# Patient Record
Sex: FEMALE | Race: WHITE | NOT HISPANIC OR LATINO | Employment: OTHER | URBAN - METROPOLITAN AREA
[De-identification: names, ages, dates, MRNs, and addresses within clinical notes are randomized per-mention and may not be internally consistent; named-entity substitution may affect disease eponyms.]

---

## 2017-02-16 ENCOUNTER — HOSPITAL ENCOUNTER (OUTPATIENT)
Dept: MAMMOGRAPHY | Facility: HOSPITAL | Age: 62
Discharge: HOME | End: 2017-02-16
Attending: OBSTETRICS & GYNECOLOGY | Admitting: OBSTETRICS & GYNECOLOGY

## 2017-02-24 ENCOUNTER — HOSPITAL ENCOUNTER (OUTPATIENT)
Dept: MAMMOGRAPHY | Facility: HOSPITAL | Age: 62
Discharge: HOME | End: 2017-02-24
Attending: OBSTETRICS & GYNECOLOGY | Admitting: OBSTETRICS & GYNECOLOGY

## 2018-02-09 ENCOUNTER — HOSPITAL ENCOUNTER (OUTPATIENT)
Dept: FAMILY MEDICINE CLINIC | Facility: CLINIC | Age: 63
Setting detail: SPECIMEN
Discharge: HOME | End: 2018-02-09
Attending: INTERNAL MEDICINE | Admitting: INTERNAL MEDICINE

## 2018-02-09 LAB
ALBUMIN SERPL-MCNC: 4.4 G/DL (ref 3.5–4.8)
ALBUMIN/GLOB SERPL: 1.5 {RATIO} (ref 1–1.7)
ALP SERPL-CCNC: 62 IU/L (ref 32–91)
ALT SERPL-CCNC: 25 IU/L (ref 14–54)
ANION GAP SERPL CALC-SCNC: 11.6 MMOL/L (ref 10–20)
AST SERPL-CCNC: 33 IU/L (ref 15–41)
BASOPHILS # BLD AUTO: 0 10*3/UL (ref 0–0.2)
BASOPHILS NFR BLD AUTO: 1 % (ref 0–2)
BILIRUB SERPL-MCNC: 0.8 MG/DL (ref 0.3–1.2)
BUN SERPL-MCNC: 11 MG/DL (ref 8–20)
BUN/CREAT SERPL: 13.8 (ref 5.4–26.2)
CALCIUM SERPL-MCNC: 9.8 MG/DL (ref 8.9–10.3)
CHLORIDE SERPL-SCNC: 102 MMOL/L (ref 101–111)
CHOLEST SERPL-MCNC: 250 MG/DL
CHOLEST/HDLC SERPL: 3.6 {RATIO}
CONV CO2: 28 MMOL/L (ref 22–32)
CONV LDL CHOLESTEROL DIRECT: 166 MG/DL (ref 0–100)
CONV TOTAL PROTEIN: 7.4 G/DL (ref 6.1–7.9)
CREAT UR-MCNC: 0.8 MG/DL (ref 0.4–1)
DIFFERENTIAL METHOD BLD: (no result)
EOSINOPHIL # BLD AUTO: 0 % (ref 0–3)
EOSINOPHIL # BLD AUTO: 0 10*3/UL (ref 0–0.3)
ERYTHROCYTE [DISTWIDTH] IN BLOOD BY AUTOMATED COUNT: 13.4 % (ref 11.5–14.5)
GLOBULIN UR ELPH-MCNC: 3 G/DL (ref 2.5–3.8)
GLUCOSE SERPL-MCNC: 83 MG/DL (ref 65–99)
HCT VFR BLD AUTO: 42.7 % (ref 35–49)
HDLC SERPL-MCNC: 69 MG/DL
HGB BLD-MCNC: 14.4 G/DL (ref 12–15)
LDLC/HDLC SERPL: 2.4 {RATIO}
LIPID INTERPRETATION: ABNORMAL
LYMPHOCYTES # BLD AUTO: 2.8 10*3/UL (ref 0.8–4.8)
LYMPHOCYTES NFR BLD AUTO: 37 % (ref 18–42)
MCH RBC QN AUTO: 30.5 PG (ref 26–32)
MCHC RBC AUTO-ENTMCNC: 33.7 G/DL (ref 32–36)
MCV RBC AUTO: 90.6 FL (ref 80–94)
MONOCYTES # BLD AUTO: 0.4 10*3/UL (ref 0.1–1.3)
MONOCYTES NFR BLD AUTO: 6 % (ref 2–11)
NEUTROPHILS # BLD AUTO: 4.3 10*3/UL (ref 2.3–8.6)
NEUTROPHILS NFR BLD AUTO: 56 % (ref 50–75)
NRBC BLD AUTO-RTO: 0 /100{WBCS}
NRBC/RBC NFR BLD MANUAL: 0 10*3/UL
PLATELET # BLD AUTO: 249 10*3/UL (ref 150–450)
PMV BLD AUTO: 9.4 FL (ref 7.4–10.4)
POTASSIUM SERPL-SCNC: 3.6 MMOL/L (ref 3.6–5.1)
RBC # BLD AUTO: 4.71 10*6/UL (ref 4–5.4)
SODIUM SERPL-SCNC: 138 MMOL/L (ref 136–144)
TRIGL SERPL-MCNC: 58 MG/DL
VLDLC SERPL CALC-MCNC: 15 MG/DL
WBC # BLD AUTO: 7.7 10*3/UL (ref 4.5–11.5)

## 2018-03-27 ENCOUNTER — HOSPITAL ENCOUNTER (OUTPATIENT)
Dept: MAMMOGRAPHY | Facility: HOSPITAL | Age: 63
Discharge: HOME | End: 2018-03-27
Attending: OBSTETRICS & GYNECOLOGY | Admitting: OBSTETRICS & GYNECOLOGY

## 2018-05-22 ENCOUNTER — HOSPITAL ENCOUNTER (OUTPATIENT)
Dept: FAMILY MEDICINE CLINIC | Facility: CLINIC | Age: 63
Setting detail: SPECIMEN
Discharge: HOME | End: 2018-05-22
Attending: INTERNAL MEDICINE | Admitting: INTERNAL MEDICINE

## 2018-05-22 LAB
ALBUMIN SERPL-MCNC: 4.3 G/DL (ref 3.5–4.8)
ALBUMIN/GLOB SERPL: 1.6 {RATIO} (ref 1–1.7)
ALP SERPL-CCNC: 62 IU/L (ref 32–91)
ALT SERPL-CCNC: 22 IU/L (ref 14–54)
ANION GAP SERPL CALC-SCNC: 11.1 MMOL/L (ref 10–20)
AST SERPL-CCNC: 34 IU/L (ref 15–41)
BILIRUB SERPL-MCNC: 0.9 MG/DL (ref 0.3–1.2)
BUN SERPL-MCNC: 7 MG/DL (ref 8–20)
BUN/CREAT SERPL: 7.8 (ref 5.4–26.2)
CALCIUM SERPL-MCNC: 9.8 MG/DL (ref 8.9–10.3)
CHLORIDE SERPL-SCNC: 103 MMOL/L (ref 101–111)
CHOLEST SERPL-MCNC: 209 MG/DL
CHOLEST/HDLC SERPL: 4 {RATIO}
CONV CO2: 30 MMOL/L (ref 22–32)
CONV LDL CHOLESTEROL DIRECT: 129 MG/DL (ref 0–100)
CONV TOTAL PROTEIN: 7 G/DL (ref 6.1–7.9)
CREAT UR-MCNC: 0.9 MG/DL (ref 0.4–1)
GLOBULIN UR ELPH-MCNC: 2.7 G/DL (ref 2.5–3.8)
GLUCOSE SERPL-MCNC: 85 MG/DL (ref 65–99)
HDLC SERPL-MCNC: 53 MG/DL
LDLC/HDLC SERPL: 2.5 {RATIO}
LIPID INTERPRETATION: ABNORMAL
POTASSIUM SERPL-SCNC: 4.1 MMOL/L (ref 3.6–5.1)
SODIUM SERPL-SCNC: 140 MMOL/L (ref 136–144)
TRIGL SERPL-MCNC: 103 MG/DL
VLDLC SERPL CALC-MCNC: 27.7 MG/DL

## 2019-04-30 ENCOUNTER — HOSPITAL ENCOUNTER (OUTPATIENT)
Dept: FAMILY MEDICINE CLINIC | Facility: CLINIC | Age: 64
Setting detail: SPECIMEN
Discharge: HOME | End: 2019-04-30
Attending: INTERNAL MEDICINE | Admitting: INTERNAL MEDICINE

## 2019-04-30 ENCOUNTER — HOSPITAL ENCOUNTER (OUTPATIENT)
Dept: MAMMOGRAPHY | Facility: HOSPITAL | Age: 64
Discharge: HOME | End: 2019-04-30
Attending: OBSTETRICS & GYNECOLOGY | Admitting: OBSTETRICS & GYNECOLOGY

## 2019-04-30 LAB
ALBUMIN SERPL-MCNC: 4.8 G/DL (ref 3.5–4.8)
ALBUMIN/GLOB SERPL: 1.9 {RATIO} (ref 1–1.7)
ALP SERPL-CCNC: 68 IU/L (ref 32–91)
ALT SERPL-CCNC: 21 IU/L (ref 14–54)
ANION GAP SERPL CALC-SCNC: 15.6 MMOL/L (ref 10–20)
AST SERPL-CCNC: 28 IU/L (ref 15–41)
BASOPHILS # BLD AUTO: 0.1 10*3/UL (ref 0–0.2)
BASOPHILS NFR BLD AUTO: 1 % (ref 0–2)
BILIRUB SERPL-MCNC: 0.7 MG/DL (ref 0.3–1.2)
BUN SERPL-MCNC: 7 MG/DL (ref 8–20)
BUN/CREAT SERPL: 7.8 (ref 5.4–26.2)
CALCIUM SERPL-MCNC: 9.9 MG/DL (ref 8.9–10.3)
CHLORIDE SERPL-SCNC: 102 MMOL/L (ref 101–111)
CHOLEST SERPL-MCNC: 203 MG/DL
CHOLEST/HDLC SERPL: 3.4 {RATIO}
CONV CO2: 27 MMOL/L (ref 22–32)
CONV LDL CHOLESTEROL DIRECT: 126 MG/DL (ref 0–100)
CONV TOTAL PROTEIN: 7.3 G/DL (ref 6.1–7.9)
CREAT UR-MCNC: 0.9 MG/DL (ref 0.4–1)
DIFFERENTIAL METHOD BLD: (no result)
EOSINOPHIL # BLD AUTO: 0 % (ref 0–3)
EOSINOPHIL # BLD AUTO: 0 10*3/UL (ref 0–0.3)
ERYTHROCYTE [DISTWIDTH] IN BLOOD BY AUTOMATED COUNT: 13.7 % (ref 11.5–14.5)
GLOBULIN UR ELPH-MCNC: 2.5 G/DL (ref 2.5–3.8)
GLUCOSE SERPL-MCNC: 79 MG/DL (ref 65–99)
HCT VFR BLD AUTO: 45.3 % (ref 35–49)
HDLC SERPL-MCNC: 60 MG/DL
HGB BLD-MCNC: 15 G/DL (ref 12–15)
LDLC/HDLC SERPL: 2.1 {RATIO}
LIPID INTERPRETATION: ABNORMAL
LYMPHOCYTES # BLD AUTO: 3.2 10*3/UL (ref 0.8–4.8)
LYMPHOCYTES NFR BLD AUTO: 31 % (ref 18–42)
MCH RBC QN AUTO: 30.6 PG (ref 26–32)
MCHC RBC AUTO-ENTMCNC: 33.2 G/DL (ref 32–36)
MCV RBC AUTO: 92.3 FL (ref 80–94)
MONOCYTES # BLD AUTO: 0.4 10*3/UL (ref 0.1–1.3)
MONOCYTES NFR BLD AUTO: 4 % (ref 2–11)
NEUTROPHILS # BLD AUTO: 6.5 10*3/UL (ref 2.3–8.6)
NEUTROPHILS NFR BLD AUTO: 64 % (ref 50–75)
NRBC BLD AUTO-RTO: 0 /100{WBCS}
NRBC/RBC NFR BLD MANUAL: 0 10*3/UL
PLATELET # BLD AUTO: 217 10*3/UL (ref 150–450)
PMV BLD AUTO: 10.7 FL (ref 7.4–10.4)
POTASSIUM SERPL-SCNC: 3.6 MMOL/L (ref 3.6–5.1)
RBC # BLD AUTO: 4.91 10*6/UL (ref 4–5.4)
SODIUM SERPL-SCNC: 141 MMOL/L (ref 136–144)
TRIGL SERPL-MCNC: 98 MG/DL
VLDLC SERPL CALC-MCNC: 16.4 MG/DL
WBC # BLD AUTO: 10.1 10*3/UL (ref 4.5–11.5)

## 2020-02-25 ENCOUNTER — TRANSCRIBE ORDERS (OUTPATIENT)
Dept: ADMINISTRATIVE | Facility: HOSPITAL | Age: 65
End: 2020-02-25

## 2020-02-25 DIAGNOSIS — Z12.31 VISIT FOR SCREENING MAMMOGRAM: Primary | ICD-10-CM

## 2020-05-05 ENCOUNTER — TELEPHONE (OUTPATIENT)
Dept: FAMILY MEDICINE CLINIC | Facility: CLINIC | Age: 65
End: 2020-05-05

## 2020-05-05 ENCOUNTER — OFFICE VISIT (OUTPATIENT)
Dept: FAMILY MEDICINE CLINIC | Facility: CLINIC | Age: 65
End: 2020-05-05

## 2020-05-05 ENCOUNTER — HOSPITAL ENCOUNTER (OUTPATIENT)
Dept: MAMMOGRAPHY | Facility: HOSPITAL | Age: 65
Discharge: HOME | End: 2020-05-05
Admitting: OBSTETRICS & GYNECOLOGY

## 2020-05-05 VITALS
WEIGHT: 139 LBS | HEIGHT: 64 IN | DIASTOLIC BLOOD PRESSURE: 80 MMHG | HEART RATE: 54 BPM | OXYGEN SATURATION: 100 % | TEMPERATURE: 98 F | SYSTOLIC BLOOD PRESSURE: 142 MMHG | BODY MASS INDEX: 23.73 KG/M2 | RESPIRATION RATE: 16 BRPM

## 2020-05-05 DIAGNOSIS — Z12.31 VISIT FOR SCREENING MAMMOGRAM: ICD-10-CM

## 2020-05-05 DIAGNOSIS — I10 ESSENTIAL HYPERTENSION: ICD-10-CM

## 2020-05-05 DIAGNOSIS — E55.9 VITAMIN D DEFICIENCY DISEASE: ICD-10-CM

## 2020-05-05 DIAGNOSIS — E53.8 VITAMIN B12 DEFICIENCY: ICD-10-CM

## 2020-05-05 DIAGNOSIS — Z00.00 ENCOUNTER FOR GENERAL ADULT MEDICAL EXAMINATION WITHOUT ABNORMAL FINDINGS: Primary | ICD-10-CM

## 2020-05-05 DIAGNOSIS — E78.01 FAMILIAL HYPERCHOLESTEROLEMIA: ICD-10-CM

## 2020-05-05 PROBLEM — Z12.11 ENCOUNTER FOR SCREENING FOR MALIGNANT NEOPLASM OF COLON: Status: ACTIVE | Noted: 2018-02-09

## 2020-05-05 PROBLEM — I77.9 CAROTID ARTERY DISEASE: Status: ACTIVE | Noted: 2020-05-05

## 2020-05-05 PROBLEM — N63.0 BREAST LUMP: Status: ACTIVE | Noted: 2017-02-20

## 2020-05-05 PROBLEM — E78.5 HYPERLIPIDEMIA: Status: ACTIVE | Noted: 2018-05-22

## 2020-05-05 PROBLEM — E87.5 HYPERKALEMIA: Status: ACTIVE | Noted: 2017-01-12

## 2020-05-05 LAB
25(OH)D3 SERPL-MCNC: 29.3 NG/ML (ref 30–100)
ALBUMIN SERPL-MCNC: 4.5 G/DL (ref 3.5–5.2)
ALBUMIN/GLOB SERPL: 1.6 G/DL
ALP SERPL-CCNC: 86 U/L (ref 39–117)
ALT SERPL W P-5'-P-CCNC: 29 U/L (ref 1–33)
ANION GAP SERPL CALCULATED.3IONS-SCNC: 14.3 MMOL/L (ref 5–15)
AST SERPL-CCNC: 38 U/L (ref 1–32)
BASOPHILS # BLD AUTO: 0.05 10*3/MM3 (ref 0–0.2)
BASOPHILS NFR BLD AUTO: 0.6 % (ref 0–1.5)
BILIRUB SERPL-MCNC: 0.6 MG/DL (ref 0.2–1.2)
BUN BLD-MCNC: 12 MG/DL (ref 8–23)
BUN/CREAT SERPL: 13.6 (ref 7–25)
CALCIUM SPEC-SCNC: 9.7 MG/DL (ref 8.6–10.5)
CHLORIDE SERPL-SCNC: 100 MMOL/L (ref 98–107)
CHOLEST SERPL-MCNC: 188 MG/DL (ref 0–200)
CO2 SERPL-SCNC: 25.7 MMOL/L (ref 22–29)
CREAT BLD-MCNC: 0.88 MG/DL (ref 0.57–1)
DEPRECATED RDW RBC AUTO: 41.7 FL (ref 37–54)
EOSINOPHIL # BLD AUTO: 0.02 10*3/MM3 (ref 0–0.4)
EOSINOPHIL NFR BLD AUTO: 0.2 % (ref 0.3–6.2)
ERYTHROCYTE [DISTWIDTH] IN BLOOD BY AUTOMATED COUNT: 12.6 % (ref 12.3–15.4)
FOLATE SERPL-MCNC: >20 NG/ML (ref 4.78–24.2)
GFR SERPL CREATININE-BSD FRML MDRD: 65 ML/MIN/1.73
GLOBULIN UR ELPH-MCNC: 2.9 GM/DL
GLUCOSE BLD-MCNC: 80 MG/DL (ref 65–99)
HCT VFR BLD AUTO: 43.2 % (ref 34–46.6)
HDLC SERPL-MCNC: 64 MG/DL (ref 40–60)
HGB BLD-MCNC: 14.1 G/DL (ref 12–15.9)
IMM GRANULOCYTES # BLD AUTO: 0.02 10*3/MM3 (ref 0–0.05)
IMM GRANULOCYTES NFR BLD AUTO: 0.2 % (ref 0–0.5)
LDLC SERPL CALC-MCNC: 111 MG/DL (ref 0–100)
LDLC/HDLC SERPL: 1.73 {RATIO}
LYMPHOCYTES # BLD AUTO: 4.28 10*3/MM3 (ref 0.7–3.1)
LYMPHOCYTES NFR BLD AUTO: 52.5 % (ref 19.6–45.3)
MCH RBC QN AUTO: 30.1 PG (ref 26.6–33)
MCHC RBC AUTO-ENTMCNC: 32.6 G/DL (ref 31.5–35.7)
MCV RBC AUTO: 92.1 FL (ref 79–97)
MONOCYTES # BLD AUTO: 0.37 10*3/MM3 (ref 0.1–0.9)
MONOCYTES NFR BLD AUTO: 4.5 % (ref 5–12)
NEUTROPHILS # BLD AUTO: 3.42 10*3/MM3 (ref 1.7–7)
NEUTROPHILS NFR BLD AUTO: 42 % (ref 42.7–76)
NRBC BLD AUTO-RTO: 0 /100 WBC (ref 0–0.2)
PLATELET # BLD AUTO: 232 10*3/MM3 (ref 140–450)
PMV BLD AUTO: 12.3 FL (ref 6–12)
POTASSIUM BLD-SCNC: 3.9 MMOL/L (ref 3.5–5.2)
PROT SERPL-MCNC: 7.4 G/DL (ref 6–8.5)
RBC # BLD AUTO: 4.69 10*6/MM3 (ref 3.77–5.28)
SODIUM BLD-SCNC: 140 MMOL/L (ref 136–145)
TRIGL SERPL-MCNC: 67 MG/DL (ref 0–150)
VIT B12 BLD-MCNC: 423 PG/ML (ref 211–946)
VLDLC SERPL-MCNC: 13.4 MG/DL (ref 5–40)
WBC NRBC COR # BLD: 8.16 10*3/MM3 (ref 3.4–10.8)

## 2020-05-05 PROCEDURE — 85025 COMPLETE CBC W/AUTO DIFF WBC: CPT | Performed by: INTERNAL MEDICINE

## 2020-05-05 PROCEDURE — 77067 SCR MAMMO BI INCL CAD: CPT

## 2020-05-05 PROCEDURE — 80061 LIPID PANEL: CPT | Performed by: INTERNAL MEDICINE

## 2020-05-05 PROCEDURE — 82306 VITAMIN D 25 HYDROXY: CPT | Performed by: INTERNAL MEDICINE

## 2020-05-05 PROCEDURE — 82607 VITAMIN B-12: CPT | Performed by: INTERNAL MEDICINE

## 2020-05-05 PROCEDURE — 99396 PREV VISIT EST AGE 40-64: CPT | Performed by: INTERNAL MEDICINE

## 2020-05-05 PROCEDURE — 77063 BREAST TOMOSYNTHESIS BI: CPT

## 2020-05-05 PROCEDURE — 80053 COMPREHEN METABOLIC PANEL: CPT | Performed by: INTERNAL MEDICINE

## 2020-05-05 PROCEDURE — 82746 ASSAY OF FOLIC ACID SERUM: CPT | Performed by: INTERNAL MEDICINE

## 2020-05-05 RX ORDER — LOSARTAN POTASSIUM AND HYDROCHLOROTHIAZIDE 12.5; 5 MG/1; MG/1
1 TABLET ORAL DAILY
COMMUNITY
Start: 2020-04-06 | End: 2020-06-29

## 2020-05-05 NOTE — PROGRESS NOTES
Cholesterol was good- the ratio is under 2- which is great.   vD is borderline- needs to increase vd3 supplement-  If not taking any go to 2000. If already on 2000 double it

## 2020-05-05 NOTE — TELEPHONE ENCOUNTER
PATIENT DISCUSSED HAVING A VASCULAR SCREENING ATHER LAST APPT WITH DR. CRISTOBAL. SHE WANTS TO HAVE IT DONE AT [facility] AND THEY REQUIRE A DRS ORDER.     PATIENT CALLBACK [phone]

## 2020-05-05 NOTE — PROGRESS NOTES
"Rooming Tab(CC,VS,Pt Hx,Fall Screen)  Chief Complaint   Patient presents with   • Annual Exam       Subjective   Pt here for annual exam-- doing well- working only 2 days a week currently-  Next week to start back at 4 days.  Mom in [facility]- and having hard time   once for nocturia-   no chest pain-  Runs most days.  Did 1/2 marathon in [place] in February.  No GERD. No moles changing and no headaches.   doing well- and   I have reviewed and updated her medications, medical history and problem list during today's office visit.     Patient Care Team:  Asha Dorsey MD as PCP - General  Asha Dorsey MD as PCP - Family Medicine    Problem List Tab  Medications Tab  Synopsis Tab  Chart Review Tab  Care Everywhere Tab  Immunizations Tab  Patient History Tab    Social History     Tobacco Use   • Smoking status: Never Smoker   • Smokeless tobacco: Never Used   Substance Use Topics   • Alcohol use: Never     Frequency: Never       Review of Systems   Constitutional: Negative for fatigue, fever and unexpected weight gain.   HENT: Negative for congestion.    Respiratory: Negative for apnea, cough and wheezing.    Cardiovascular: Negative for chest pain.   Gastrointestinal: Negative for abdominal distention.   Musculoskeletal: Negative for arthralgias.   Neurological: Negative for syncope and weakness.   Psychiatric/Behavioral: Negative for behavioral problems and sleep disturbance.       Objective     Rooming Tab(CC,VS,Pt Hx,Fall Screen)  /80   Pulse 54   Temp 98 °F (36.7 °C)   Resp 16   Ht 162.6 cm (64\")   Wt 63 kg (139 lb)   SpO2 100%   BMI 23.86 kg/m²     Body mass index is 23.86 kg/m².    Physical Exam   Constitutional: She is oriented to person, place, and time. She appears well-developed and well-nourished.   HENT:   Head: Normocephalic and atraumatic.   Right Ear: Tympanic membrane normal.   Left Ear: Tympanic membrane normal.   Eyes: Pupils are equal, round, and reactive to " light.   Neck: Normal range of motion. Neck supple.   Cardiovascular: Normal rate and regular rhythm.   No murmur heard.  Pulmonary/Chest: Effort normal and breath sounds normal. She exhibits no tenderness.   Abdominal: Soft. Bowel sounds are normal. She exhibits no distension.   Musculoskeletal: She exhibits no edema or deformity.   Neurological: She is oriented to person, place, and time.   Skin: Capillary refill takes less than 2 seconds.   Psychiatric: She has a normal mood and affect.   Nursing note and vitals reviewed.       Statin Choice Calculator  Data Reviewed:    Mammo Screening Digital Tomosynthesis Bilateral With Cad    Result Date: 5/5/2020  Impression: No mammographic evidence of malignancy is demonstrated within either breast. No significant change compared to prior exams.  RECOMMENDATION: Recommend patient continue with annual bilateral screening mammography and continued clinical breast exams.  BI-RADS ASSESSMENT: BI-RADS 1. Negative.   The patient's information is entered into a computerized reminder system with a targeted due date for the next mammogram.  Note:  It has been reported that there is approximately a 15% false negative in mammography.  Therefore, management of a palpable abnormality should not be deferred because of a negative mammogram.    Electronically Signed By-Marco Macedo MD On:5/5/2020 9:30 AM This report was finalized on [id number] by  Marco Macedo MD.            Lab Results   Component Value Date    BUN 12 05/05/2020    CREATININE 0.88 05/05/2020    EGFRIFNONA 65 05/05/2020     05/05/2020    K 3.9 05/05/2020     05/05/2020    CALCIUM 9.7 05/05/2020    ALBUMIN 4.50 05/05/2020    BILITOT 0.6 05/05/2020    ALKPHOS 86 05/05/2020    AST 38 (H) 05/05/2020    ALT 29 05/05/2020    TRIG 67 05/05/2020    HDL 64 (H) 05/05/2020    VLDL 13.4 05/05/2020     (H) 05/05/2020    LDLHDL 1.73 05/05/2020    WBC 8.16 05/05/2020    RBC 4.69 05/05/2020    HCT 43.2 05/05/2020     MCV 92.1 05/05/2020    MCH 30.1 05/05/2020    [id number] 29.3 (L) 05/05/2020      Assessment/Plan   Order Review Tab  Health Maintenance Tab  Patient Plan/Order Tab  Diagnoses and all orders for this visit:    1. Encounter for general adult medical examination without abnormal findings (Primary)  Comments:  discussed all recommendations. will do a [facility] vascular test  Orders:  -     Lipid Panel  -     Comprehensive Metabolic Panel  -     CBC & Differential  -     CBC Auto Differential    2. Vitamin D deficiency disease  -     Vitamin D 25 Hydroxy    3. Vitamin B12 deficiency  -     Vitamin B12 & Folate    4. Essential hypertension  Assessment & Plan:  Hypertension is unchanged.  Regular aerobic exercise.  Blood pressure will be reassessed in 3 months.   watch and       5. Familial hypercholesterolemia      Wrapup Tab  Return for Annual physical.       During this visit for their annual exam, we reviewed their personal history, social history and family history.  We went over their medications and all the recommended health maintenence items for their age group. They were given the opportunity to ask questions and discuss other concerns.      [facility]  [facility].

## 2020-05-05 NOTE — ASSESSMENT & PLAN NOTE
Hypertension is unchanged.  Regular aerobic exercise.  Blood pressure will be reassessed in 3 months.   watch and

## 2020-06-29 RX ORDER — LOSARTAN POTASSIUM AND HYDROCHLOROTHIAZIDE 12.5; 5 MG/1; MG/1
TABLET ORAL
Qty: 90 TABLET | Refills: 0 | Status: SHIPPED | OUTPATIENT
Start: 2020-06-29 | End: 2020-09-21

## 2020-09-21 RX ORDER — LOSARTAN POTASSIUM AND HYDROCHLOROTHIAZIDE 12.5; 5 MG/1; MG/1
TABLET ORAL
Qty: 90 TABLET | Refills: 0 | Status: SHIPPED | OUTPATIENT
Start: 2020-09-21 | End: 2020-12-16

## 2020-12-16 RX ORDER — LOSARTAN POTASSIUM AND HYDROCHLOROTHIAZIDE 12.5; 5 MG/1; MG/1
TABLET ORAL
Qty: 90 TABLET | Refills: 2 | Status: SHIPPED | OUTPATIENT
Start: 2020-12-16 | End: 2021-01-04

## 2021-01-04 ENCOUNTER — OFFICE VISIT (OUTPATIENT)
Dept: FAMILY MEDICINE CLINIC | Facility: CLINIC | Age: 66
End: 2021-01-04

## 2021-01-04 ENCOUNTER — TELEPHONE (OUTPATIENT)
Dept: FAMILY MEDICINE CLINIC | Facility: CLINIC | Age: 66
End: 2021-01-04

## 2021-01-04 DIAGNOSIS — I10 ESSENTIAL HYPERTENSION: Primary | ICD-10-CM

## 2021-01-04 PROCEDURE — 99213 OFFICE O/P EST LOW 20 MIN: CPT | Performed by: INTERNAL MEDICINE

## 2021-01-04 RX ORDER — VALSARTAN AND HYDROCHLOROTHIAZIDE 160; 12.5 MG/1; MG/1
1 TABLET, FILM COATED ORAL DAILY
Qty: 30 TABLET | Refills: 3 | Status: SHIPPED | OUTPATIENT
Start: 2021-01-04 | End: 2021-04-23

## 2021-01-04 NOTE — PROGRESS NOTES
Rooming Tab(CC,VS,Pt Hx,Fall Screen)  Chief Complaint   Patient presents with   • Hypertension       Subjective   Pt here for telephone telemedicine for HTN- at work and felt poorly- friend took BP and was 160/100.  Yesterday was 192/90.  Been compliant with meds. However has  change. Doesn't feel that it is working at all. Feels pulse in ear. No chest pain, some headache.   No increase stress- has some stress as worries about mom in [facility], no COVID  I have reviewed and updated her medications, medical history and problem list during today's office visit.     Patient Care Team:  Asha Dorsey MD as PCP - General  Asha Dorsey MD as PCP - Family Medicine    Problem List Tab  Medications Tab  Synopsis Tab  Chart Review Tab  Care Everywhere Tab  Immunizations Tab  Patient History Tab    Social History     Tobacco Use   • Smoking status: Never Smoker   • Smokeless tobacco: Never Used   Substance Use Topics   • Alcohol use: Never     Frequency: Never       Review of Systems   Constitutional: Negative for fatigue and fever.   HENT: Positive for sinus pressure.    Respiratory: Negative for apnea and chest tightness.    Cardiovascular: Negative for chest pain.   Neurological: Positive for headache.       Objective     Rooming Tab(CC,VS,Pt Hx,Fall Screen)  There were no vitals taken for this visit.    There is no height or weight on file to calculate BMI.    Physical Exam  Constitutional:       General: She is not in acute distress.  Pulmonary:      Effort: No respiratory distress.   Neurological:      General: No focal deficit present.      Mental Status: She is oriented to person, place, and time.   Psychiatric:         Mood and Affect: Mood normal.          Statin Choice Calculator  Data Reviewed:                 Assessment/Plan   Order Review Tab  Health Maintenance Tab  Patient Plan/Order Tab  Diagnoses and all orders for this visit:    1. Essential hypertension  (Primary)  Comments:  DC losartan and change to valsartan instead. will call end of week with updated BP levels    Other orders  -     valsartan-hydrochlorothiazide (Diovan HCT) 160-12.5 MG per tablet; Take 1 tablet by mouth Daily.  Dispense: 30 tablet; Refill: 3        Wrapup Tab  Return in about 2 weeks (around 1/18/2021), or if symptoms worsen or fail to improve, for will call with update.       They were informed of the diagnosis and treatment plan and directed to f/u for any further problems or concerns.

## 2021-01-04 NOTE — TELEPHONE ENCOUNTER
PATIENT CALLING BECAUSE HER BP HAS BEEN ELEVATED RECENTLY A LOT EVEN WITH TAKING HER BP MEDICATION. SHE IS AT WORK AT [facility] AND HAD A NURSE TAKE HER BP AND IT /100 EVEN AFTER TAKING HER MEDS. THERE ARE NO OFFICE VISITS WITH [facility] TILL 01/19 SHE NEEDS SEEN SOONER. I SCHEDULED PATIENT A [employer] VIDEO VISIT FOR 11:15AM TODAY WITH [facility] BUT SHE IS REQUESTING A PHONE CALL INSTEAD AS SHE CANT USE VIDEO. ALSO IF POSSIBLE COULD IT BE CHANGED TO AN IN PERSON VISIT SO DR. CRISTOBAL CAN CHECK HER BP IN PERSON.     UNABLE TO WARM TRANSFER TO OFFICE    PLEASE CALL PATIENT BACK ASAP AND INFORM IF WE ARE ABLE TO CHANGE IT TO AN IN PERSON APPOINTMENT OR IF NOT PLEASE SWITCH TO TELEPHONE CALL AT 11:15 : [phone]

## 2021-01-04 NOTE — TELEPHONE ENCOUNTER
Appears that patient's visit today has already been changed to a telephone visit.  I left that info on patient's voice mail.

## 2021-03-29 ENCOUNTER — TRANSCRIBE ORDERS (OUTPATIENT)
Dept: ADMINISTRATIVE | Facility: HOSPITAL | Age: 66
End: 2021-03-29

## 2021-03-29 DIAGNOSIS — Z12.31 SCREENING MAMMOGRAM, ENCOUNTER FOR: Primary | ICD-10-CM

## 2021-04-23 RX ORDER — VALSARTAN AND HYDROCHLOROTHIAZIDE 160; 12.5 MG/1; MG/1
TABLET, FILM COATED ORAL
Qty: 90 TABLET | Refills: 1 | Status: SHIPPED | OUTPATIENT
Start: 2021-04-23 | End: 2021-10-11

## 2021-05-19 ENCOUNTER — HOSPITAL ENCOUNTER (OUTPATIENT)
Dept: MAMMOGRAPHY | Facility: HOSPITAL | Age: 66
Discharge: HOME | End: 2021-05-19
Admitting: OBSTETRICS & GYNECOLOGY

## 2021-05-19 DIAGNOSIS — Z12.31 SCREENING MAMMOGRAM, ENCOUNTER FOR: ICD-10-CM

## 2021-05-19 PROCEDURE — 77067 SCR MAMMO BI INCL CAD: CPT

## 2021-05-19 PROCEDURE — 77063 BREAST TOMOSYNTHESIS BI: CPT

## 2021-06-15 DIAGNOSIS — Z00.00 ENCOUNTER FOR GENERAL ADULT MEDICAL EXAMINATION WITHOUT ABNORMAL FINDINGS: ICD-10-CM

## 2021-06-15 DIAGNOSIS — E55.9 VITAMIN D DEFICIENCY DISEASE: ICD-10-CM

## 2021-06-15 DIAGNOSIS — E53.8 VITAMIN B12 DEFICIENCY: Primary | ICD-10-CM

## 2021-06-17 ENCOUNTER — LAB (OUTPATIENT)
Dept: FAMILY MEDICINE CLINIC | Facility: CLINIC | Age: 66
End: 2021-06-17

## 2021-06-17 DIAGNOSIS — E53.8 VITAMIN B12 DEFICIENCY: ICD-10-CM

## 2021-06-17 DIAGNOSIS — E55.9 VITAMIN D DEFICIENCY DISEASE: ICD-10-CM

## 2021-06-17 DIAGNOSIS — Z00.00 ENCOUNTER FOR GENERAL ADULT MEDICAL EXAMINATION WITHOUT ABNORMAL FINDINGS: ICD-10-CM

## 2021-06-17 LAB
25(OH)D3 SERPL-MCNC: 43.4 NG/ML
ALBUMIN SERPL-MCNC: 4.9 G/DL (ref 3.5–5.2)
ALBUMIN/GLOB SERPL: 2.5 G/DL
ALP SERPL-CCNC: 71 U/L (ref 39–117)
ALT SERPL W P-5'-P-CCNC: 22 U/L (ref 1–33)
ANION GAP SERPL CALCULATED.3IONS-SCNC: 7.6 MMOL/L (ref 5–15)
AST SERPL-CCNC: 31 U/L (ref 1–32)
BILIRUB SERPL-MCNC: 0.5 MG/DL (ref 0–1.2)
BILIRUB UR QL STRIP: NEGATIVE
BUN SERPL-MCNC: 12 MG/DL (ref 8–23)
BUN/CREAT SERPL: 14.8 (ref 7–25)
CALCIUM SPEC-SCNC: 9.8 MG/DL (ref 8.6–10.5)
CHLORIDE SERPL-SCNC: 102 MMOL/L (ref 98–107)
CHOLEST SERPL-MCNC: 225 MG/DL (ref 0–200)
CLARITY UR: CLEAR
CO2 SERPL-SCNC: 29.4 MMOL/L (ref 22–29)
COLOR UR: YELLOW
CREAT SERPL-MCNC: 0.81 MG/DL (ref 0.57–1)
DEPRECATED RDW RBC AUTO: 42.1 FL (ref 37–54)
EOSINOPHIL # BLD MANUAL: 0.1 10*3/MM3 (ref 0–0.4)
EOSINOPHIL NFR BLD MANUAL: 1 % (ref 0.3–6.2)
ERYTHROCYTE [DISTWIDTH] IN BLOOD BY AUTOMATED COUNT: 12.4 % (ref 12.3–15.4)
GFR SERPL CREATININE-BSD FRML MDRD: 71 ML/MIN/1.73
GLOBULIN UR ELPH-MCNC: 2 GM/DL
GLUCOSE SERPL-MCNC: 92 MG/DL (ref 65–99)
GLUCOSE UR STRIP-MCNC: NEGATIVE MG/DL
HCT VFR BLD AUTO: 44.7 % (ref 34–46.6)
HDLC SERPL-MCNC: 69 MG/DL (ref 40–60)
HGB BLD-MCNC: 14.5 G/DL (ref 12–15.9)
HGB UR QL STRIP.AUTO: NEGATIVE
KETONES UR QL STRIP: NEGATIVE
LDLC SERPL CALC-MCNC: 135 MG/DL (ref 0–100)
LDLC/HDLC SERPL: 1.91 {RATIO}
LEUKOCYTE ESTERASE UR QL STRIP.AUTO: NEGATIVE
LYMPHOCYTES # BLD MANUAL: 4.65 10*3/MM3 (ref 0.7–3.1)
LYMPHOCYTES NFR BLD MANUAL: 4 % (ref 5–12)
LYMPHOCYTES NFR BLD MANUAL: 46.5 % (ref 19.6–45.3)
MCH RBC QN AUTO: 30 PG (ref 26.6–33)
MCHC RBC AUTO-ENTMCNC: 32.4 G/DL (ref 31.5–35.7)
MCV RBC AUTO: 92.4 FL (ref 79–97)
MONOCYTES # BLD AUTO: 0.4 10*3/MM3 (ref 0.1–0.9)
NEUTROPHILS # BLD AUTO: 4.85 10*3/MM3 (ref 1.7–7)
NEUTROPHILS NFR BLD MANUAL: 48.5 % (ref 42.7–76)
NITRITE UR QL STRIP: NEGATIVE
PH UR STRIP.AUTO: 7 [PH] (ref 5–8)
PLAT MORPH BLD: NORMAL
PLATELET # BLD AUTO: 264 10*3/MM3 (ref 140–450)
PMV BLD AUTO: 12 FL (ref 6–12)
POTASSIUM SERPL-SCNC: 4.3 MMOL/L (ref 3.5–5.2)
PROT SERPL-MCNC: 6.9 G/DL (ref 6–8.5)
PROT UR QL STRIP: NEGATIVE
RBC # BLD AUTO: 4.84 10*6/MM3 (ref 3.77–5.28)
RBC MORPH BLD: NORMAL
SMUDGE CELLS BLD QL SMEAR: ABNORMAL
SODIUM SERPL-SCNC: 139 MMOL/L (ref 136–145)
SP GR UR STRIP: <=1.005 (ref 1–1.03)
TRIGL SERPL-MCNC: 121 MG/DL (ref 0–150)
TSH SERPL DL<=0.05 MIU/L-ACNC: 1.03 UIU/ML (ref 0.27–4.2)
UROBILINOGEN UR QL STRIP: NORMAL
VIT B12 BLD-MCNC: 450 PG/ML (ref 211–946)
VLDLC SERPL-MCNC: 21 MG/DL (ref 5–40)
WBC # BLD AUTO: 10 10*3/MM3 (ref 3.4–10.8)

## 2021-06-17 PROCEDURE — 82306 VITAMIN D 25 HYDROXY: CPT | Performed by: INTERNAL MEDICINE

## 2021-06-17 PROCEDURE — 85007 BL SMEAR W/DIFF WBC COUNT: CPT

## 2021-06-17 PROCEDURE — 80061 LIPID PANEL: CPT | Performed by: INTERNAL MEDICINE

## 2021-06-17 PROCEDURE — 80053 COMPREHEN METABOLIC PANEL: CPT | Performed by: INTERNAL MEDICINE

## 2021-06-17 PROCEDURE — 81003 URINALYSIS AUTO W/O SCOPE: CPT | Performed by: INTERNAL MEDICINE

## 2021-06-17 PROCEDURE — 36415 COLL VENOUS BLD VENIPUNCTURE: CPT

## 2021-06-17 PROCEDURE — 85025 COMPLETE CBC W/AUTO DIFF WBC: CPT | Performed by: INTERNAL MEDICINE

## 2021-06-17 PROCEDURE — 84443 ASSAY THYROID STIM HORMONE: CPT | Performed by: INTERNAL MEDICINE

## 2021-06-17 PROCEDURE — 82607 VITAMIN B-12: CPT | Performed by: INTERNAL MEDICINE

## 2021-06-23 ENCOUNTER — OFFICE VISIT (OUTPATIENT)
Dept: FAMILY MEDICINE CLINIC | Facility: CLINIC | Age: 66
End: 2021-06-23

## 2021-06-23 VITALS
RESPIRATION RATE: 10 BRPM | DIASTOLIC BLOOD PRESSURE: 72 MMHG | SYSTOLIC BLOOD PRESSURE: 124 MMHG | OXYGEN SATURATION: 100 % | HEIGHT: 64 IN | BODY MASS INDEX: 24.04 KG/M2 | WEIGHT: 140.8 LBS | HEART RATE: 63 BPM

## 2021-06-23 DIAGNOSIS — Z12.11 ENCOUNTER FOR SCREENING FOR MALIGNANT NEOPLASM OF COLON: ICD-10-CM

## 2021-06-23 DIAGNOSIS — Z00.00 ENCOUNTER FOR GENERAL ADULT MEDICAL EXAMINATION WITHOUT ABNORMAL FINDINGS: Primary | ICD-10-CM

## 2021-06-23 PROCEDURE — 99396 PREV VISIT EST AGE 40-64: CPT | Performed by: INTERNAL MEDICINE

## 2021-06-23 NOTE — PROGRESS NOTES
"Rooming Tab(CC,VS,Pt Hx,Fall Screen)  Chief Complaint   Patient presents with   • Annual Exam       Subjective   Pt here for annual exam- doing well  Teaching swimming lessons   taking trip out west [address] to [place]- over 3 weeks  Reviewed labs and mammogram reviewed-  Never had dexascan    no depression or anxiety   some looser stool- since stress- mom  in   BP doing well- no headaches or cardiac issues    I have reviewed and updated her medications, medical history and problem list during today's office visit.     Patient Care Team:  Asha Dorsey MD as PCP - General  Asha Dorsey MD as PCP - Family Medicine    Problem List Tab  Medications Tab  Synopsis Tab  Chart Review Tab  Care Everywhere Tab  Immunizations Tab  Patient History Tab    Social History     Tobacco Use   • Smoking status: Never Smoker   • Smokeless tobacco: Never Used   Substance Use Topics   • Alcohol use: Never       Review of Systems   Constitutional: Negative for fatigue.   Cardiovascular: Negative for chest pain.       Objective     Rooming Tab(CC,VS,Pt Hx,Fall Screen)  /72   Pulse 63   Resp 10   Ht 162.6 cm (64\")   Wt 63.9 kg (140 lb 12.8 oz)   SpO2 100%   BMI 24.17 kg/m²     Body mass index is 24.17 kg/m².    Physical Exam  Vitals and nursing note reviewed.   Constitutional:       Appearance: Normal appearance. She is well-developed.   HENT:      Head: Normocephalic and atraumatic.      Right Ear: Tympanic membrane normal.      Left Ear: Tympanic membrane normal.      Nose: No rhinorrhea.      Mouth/Throat:      Pharynx: No posterior oropharyngeal erythema.   Eyes:      Pupils: Pupils are equal, round, and reactive to light.   Cardiovascular:      Rate and Rhythm: Normal rate and regular rhythm.      Pulses: Normal pulses.      Heart sounds: Normal heart sounds. No murmur heard.     Pulmonary:      Effort: Pulmonary effort is normal.      Breath sounds: Normal breath sounds.   Abdominal:    "   General: Bowel sounds are normal. There is no distension.      Palpations: Abdomen is soft.   Musculoskeletal:         General: No tenderness.      Cervical back: Normal range of motion and neck supple.   Skin:     Capillary Refill: Capillary refill takes less than 2 seconds.   Neurological:      Mental Status: She is alert and oriented to person, place, and time.   Psychiatric:         Mood and Affect: Mood normal.         Behavior: Behavior normal.          Statin Choice Calculator  Data Reviewed:         The data below has been reviewed by Asha Dorsey MD on 06/23/2021.      Lab Results   Component Value Date    BUN 12 06/17/2021    CREATININE 0.81 06/17/2021    EGFRIFNONA 71 06/17/2021     06/17/2021    K 4.3 06/17/2021     06/17/2021    CALCIUM 9.8 06/17/2021    ALBUMIN 4.90 06/17/2021    BILITOT 0.5 06/17/2021    ALKPHOS 71 06/17/2021    AST 31 06/17/2021    ALT 22 06/17/2021    TRIG 121 06/17/2021    HDL 69 (H) 06/17/2021    VLDL 21 06/17/2021     (H) 06/17/2021    LDLHDL 1.91 06/17/2021    WBC 10.00 06/17/2021    RBC 4.84 06/17/2021    HCT 44.7 06/17/2021    MCV 92.4 06/17/2021    MCH 30.0 06/17/2021    TSH 1.030 06/17/2021    LQGG71NB 43.4 06/17/2021      Assessment/Plan   Order Review Tab  Health Maintenance Tab  Patient Plan/Order Tab  Diagnoses and all orders for this visit:    1. Encounter for general adult medical examination without abnormal findings (Primary)  Comments:  discussed all recommendations    2. Encounter for screening for malignant neoplasm of colon  -     Cologuard - Stool, Per Rectum; Future        Wrapup Tab  Return in about 1 year (around 6/23/2022) for Annual physical.       During this visit for their annual exam, we reviewed their personal history, social history and family history.  We went over their medications and all the recommended health maintenence items for their age group. They were given the opportunity to ask questions and discuss other  concerns.

## 2021-10-11 RX ORDER — VALSARTAN AND HYDROCHLOROTHIAZIDE 160; 12.5 MG/1; MG/1
TABLET, FILM COATED ORAL
Qty: 90 TABLET | Refills: 1 | Status: SHIPPED | OUTPATIENT
Start: 2021-10-11 | End: 2022-04-04

## 2022-04-04 RX ORDER — VALSARTAN AND HYDROCHLOROTHIAZIDE 160; 12.5 MG/1; MG/1
TABLET, FILM COATED ORAL
Qty: 90 TABLET | Refills: 1 | Status: SHIPPED | OUTPATIENT
Start: 2022-04-04 | End: 2022-08-13

## 2022-07-11 ENCOUNTER — TRANSCRIBE ORDERS (OUTPATIENT)
Dept: ADMINISTRATIVE | Facility: HOSPITAL | Age: 67
End: 2022-07-11

## 2022-07-11 DIAGNOSIS — Z12.31 VISIT FOR SCREENING MAMMOGRAM: Primary | ICD-10-CM

## 2022-08-08 ENCOUNTER — HOSPITAL ENCOUNTER (OUTPATIENT)
Dept: MAMMOGRAPHY | Facility: HOSPITAL | Age: 67
Discharge: HOME | End: 2022-08-08
Admitting: OBSTETRICS & GYNECOLOGY

## 2022-08-08 DIAGNOSIS — Z12.31 VISIT FOR SCREENING MAMMOGRAM: ICD-10-CM

## 2022-08-08 PROCEDURE — 77067 SCR MAMMO BI INCL CAD: CPT

## 2022-08-08 PROCEDURE — 77063 BREAST TOMOSYNTHESIS BI: CPT

## 2022-08-11 ENCOUNTER — PATIENT MESSAGE (OUTPATIENT)
Dept: FAMILY MEDICINE CLINIC | Facility: CLINIC | Age: 67
End: 2022-08-11

## 2022-08-13 RX ORDER — VALSARTAN 160 MG/1
160 TABLET ORAL DAILY
Qty: 30 TABLET | Refills: 2 | Status: SHIPPED | OUTPATIENT
Start: 2022-08-13 | End: 2022-09-06

## 2022-08-14 NOTE — TELEPHONE ENCOUNTER
From: Kami Mcneil  To: Asha Dorsey MD  Sent: 8/11/2022 7:03 AM EDT  Subject: valsartan-hctz 160-12.5    I would like to know if I can be taken off the HCTZ part of this prescription. I am not over weight I exercise and eat healthy. I have been getting up 2 times a night to go to the bathroom. Just curious if the water pill is causing this problem.  My labs are scheduled for August 30 with an appointment on September 6 for my annual exam. I would like to make this change asap to see if I get the same results with my BP. I had an appointment with Dr Douglas and my BP was 128/63 on August 8,2022. Please advise.  Kami Mcneil [birth year]

## 2022-08-30 ENCOUNTER — LAB (OUTPATIENT)
Dept: FAMILY MEDICINE CLINIC | Facility: CLINIC | Age: 67
End: 2022-08-30

## 2022-08-30 DIAGNOSIS — E55.9 VITAMIN D DEFICIENCY DISEASE: Primary | ICD-10-CM

## 2022-08-30 DIAGNOSIS — E78.01 FAMILIAL HYPERCHOLESTEROLEMIA: ICD-10-CM

## 2022-08-30 DIAGNOSIS — E53.8 VITAMIN B12 DEFICIENCY: ICD-10-CM

## 2022-08-30 DIAGNOSIS — I10 ESSENTIAL HYPERTENSION: ICD-10-CM

## 2022-08-30 LAB
25(OH)D3 SERPL-MCNC: 45.9 NG/ML (ref 30–100)
ALBUMIN SERPL-MCNC: 4.2 G/DL (ref 3.5–5.2)
ALBUMIN/GLOB SERPL: 1.8 G/DL
ALP SERPL-CCNC: 64 U/L (ref 39–117)
ALT SERPL W P-5'-P-CCNC: 20 U/L (ref 1–33)
ANION GAP SERPL CALCULATED.3IONS-SCNC: 12 MMOL/L (ref 5–15)
AST SERPL-CCNC: 26 U/L (ref 1–32)
BILIRUB SERPL-MCNC: 0.4 MG/DL (ref 0–1.2)
BILIRUB UR QL STRIP: NEGATIVE
BUN SERPL-MCNC: 14 MG/DL (ref 8–23)
BUN/CREAT SERPL: 15.4 (ref 7–25)
BURR CELLS BLD QL SMEAR: ABNORMAL
CALCIUM SPEC-SCNC: 9.4 MG/DL (ref 8.6–10.5)
CHLORIDE SERPL-SCNC: 104 MMOL/L (ref 98–107)
CHOLEST SERPL-MCNC: 201 MG/DL (ref 0–200)
CLARITY UR: CLEAR
CO2 SERPL-SCNC: 25 MMOL/L (ref 22–29)
COLOR UR: YELLOW
CREAT SERPL-MCNC: 0.91 MG/DL (ref 0.57–1)
DEPRECATED RDW RBC AUTO: 44 FL (ref 37–54)
EGFRCR SERPLBLD CKD-EPI 2021: 70.2 ML/MIN/1.73
EOSINOPHIL # BLD MANUAL: 0.11 10*3/MM3 (ref 0–0.4)
EOSINOPHIL NFR BLD MANUAL: 1.1 % (ref 0.3–6.2)
ERYTHROCYTE [DISTWIDTH] IN BLOOD BY AUTOMATED COUNT: 13.4 % (ref 12.3–15.4)
GLOBULIN UR ELPH-MCNC: 2.3 GM/DL
GLUCOSE SERPL-MCNC: 83 MG/DL (ref 65–99)
GLUCOSE UR STRIP-MCNC: NEGATIVE MG/DL
HCT VFR BLD AUTO: 42.4 % (ref 34–46.6)
HDLC SERPL-MCNC: 64 MG/DL (ref 40–60)
HGB BLD-MCNC: 13.9 G/DL (ref 12–15.9)
HGB UR QL STRIP.AUTO: NEGATIVE
KETONES UR QL STRIP: NEGATIVE
LDLC SERPL CALC-MCNC: 121 MG/DL (ref 0–100)
LDLC/HDLC SERPL: 1.86 {RATIO}
LEUKOCYTE ESTERASE UR QL STRIP.AUTO: NEGATIVE
LYMPHOCYTES # BLD MANUAL: 5.62 10*3/MM3 (ref 0.7–3.1)
LYMPHOCYTES NFR BLD MANUAL: 2.2 % (ref 5–12)
MCH RBC QN AUTO: 30.4 PG (ref 26.6–33)
MCHC RBC AUTO-ENTMCNC: 32.8 G/DL (ref 31.5–35.7)
MCV RBC AUTO: 92.8 FL (ref 79–97)
MONOCYTES # BLD: 0.21 10*3/MM3 (ref 0.1–0.9)
NEUTROPHILS # BLD AUTO: 3.75 10*3/MM3 (ref 1.7–7)
NEUTROPHILS NFR BLD MANUAL: 38.7 % (ref 42.7–76)
NITRITE UR QL STRIP: NEGATIVE
PH UR STRIP.AUTO: 6.5 [PH] (ref 5–8)
PLAT MORPH BLD: NORMAL
PLATELET # BLD AUTO: 248 10*3/MM3 (ref 140–450)
PMV BLD AUTO: 11.9 FL (ref 6–12)
POTASSIUM SERPL-SCNC: 4.1 MMOL/L (ref 3.5–5.2)
PROT SERPL-MCNC: 6.5 G/DL (ref 6–8.5)
PROT UR QL STRIP: NEGATIVE
RBC # BLD AUTO: 4.57 10*6/MM3 (ref 3.77–5.28)
SMUDGE CELLS BLD QL SMEAR: ABNORMAL
SODIUM SERPL-SCNC: 141 MMOL/L (ref 136–145)
SP GR UR STRIP: 1.01 (ref 1–1.03)
TRIGL SERPL-MCNC: 91 MG/DL (ref 0–150)
UROBILINOGEN UR QL STRIP: NORMAL
VARIANT LYMPHS NFR BLD MANUAL: 58.1 % (ref 19.6–45.3)
VIT B12 BLD-MCNC: 457 PG/ML (ref 211–946)
VLDLC SERPL-MCNC: 16 MG/DL (ref 5–40)
WBC NRBC COR # BLD: 9.68 10*3/MM3 (ref 3.4–10.8)

## 2022-08-30 PROCEDURE — 36415 COLL VENOUS BLD VENIPUNCTURE: CPT

## 2022-08-30 PROCEDURE — 81003 URINALYSIS AUTO W/O SCOPE: CPT | Performed by: INTERNAL MEDICINE

## 2022-08-30 PROCEDURE — 82607 VITAMIN B-12: CPT | Performed by: INTERNAL MEDICINE

## 2022-08-30 PROCEDURE — 80053 COMPREHEN METABOLIC PANEL: CPT | Performed by: INTERNAL MEDICINE

## 2022-08-30 PROCEDURE — 82306 VITAMIN D 25 HYDROXY: CPT | Performed by: INTERNAL MEDICINE

## 2022-08-30 PROCEDURE — 80061 LIPID PANEL: CPT | Performed by: INTERNAL MEDICINE

## 2022-08-30 PROCEDURE — 85007 BL SMEAR W/DIFF WBC COUNT: CPT | Performed by: INTERNAL MEDICINE

## 2022-08-30 PROCEDURE — 85025 COMPLETE CBC W/AUTO DIFF WBC: CPT | Performed by: INTERNAL MEDICINE

## 2022-09-06 ENCOUNTER — OFFICE VISIT (OUTPATIENT)
Dept: FAMILY MEDICINE CLINIC | Facility: CLINIC | Age: 67
End: 2022-09-06

## 2022-09-06 VITALS
BODY MASS INDEX: 23.25 KG/M2 | DIASTOLIC BLOOD PRESSURE: 80 MMHG | SYSTOLIC BLOOD PRESSURE: 128 MMHG | WEIGHT: 136.2 LBS | TEMPERATURE: 97.8 F | OXYGEN SATURATION: 99 % | HEART RATE: 64 BPM | RESPIRATION RATE: 16 BRPM | HEIGHT: 64 IN

## 2022-09-06 DIAGNOSIS — Z78.0 MENOPAUSE: ICD-10-CM

## 2022-09-06 DIAGNOSIS — Z00.00 WELCOME TO MEDICARE PREVENTIVE VISIT: ICD-10-CM

## 2022-09-06 DIAGNOSIS — E78.01 FAMILIAL HYPERCHOLESTEROLEMIA: ICD-10-CM

## 2022-09-06 DIAGNOSIS — Z00.00 ENCOUNTER FOR GENERAL ADULT MEDICAL EXAMINATION WITHOUT ABNORMAL FINDINGS: Primary | ICD-10-CM

## 2022-09-06 DIAGNOSIS — I10 PRIMARY HYPERTENSION: ICD-10-CM

## 2022-09-06 DIAGNOSIS — E55.9 VITAMIN D DEFICIENCY DISEASE: ICD-10-CM

## 2022-09-06 PROCEDURE — 1160F RVW MEDS BY RX/DR IN RCRD: CPT | Performed by: INTERNAL MEDICINE

## 2022-09-06 PROCEDURE — 1170F FXNL STATUS ASSESSED: CPT | Performed by: INTERNAL MEDICINE

## 2022-09-06 PROCEDURE — G0403 EKG FOR INITIAL PREVENT EXAM: HCPCS | Performed by: INTERNAL MEDICINE

## 2022-09-06 PROCEDURE — 99213 OFFICE O/P EST LOW 20 MIN: CPT | Performed by: INTERNAL MEDICINE

## 2022-09-06 PROCEDURE — G0402 INITIAL PREVENTIVE EXAM: HCPCS | Performed by: INTERNAL MEDICINE

## 2022-09-06 RX ORDER — VALSARTAN 160 MG/1
TABLET ORAL
Qty: 30 TABLET | Refills: 2 | Status: SHIPPED | OUTPATIENT
Start: 2022-09-06 | End: 2022-09-06 | Stop reason: SDUPTHER

## 2022-09-06 RX ORDER — VALSARTAN 160 MG/1
160 TABLET ORAL DAILY
Qty: 90 TABLET | Refills: 2 | Status: SHIPPED | OUTPATIENT
Start: 2022-09-06 | End: 2022-11-09 | Stop reason: SDUPTHER

## 2022-09-06 NOTE — PROGRESS NOTES
The ABCs of the Annual Wellness Visit  [place] to [place] Visit    Chief Complaint   Patient presents with   • [place] To Medicare     Welcome to Medicare       Subjective {   History of Present Illness:  Kami Mcneil is a 67 y.o. female who presents for a  Welcome to Medicare Visit.    Miss Mcneil presents today for a [place] to Medicare visit.    Sleep  She reports that she has been sleeping well. She states that she has been getting up once to urinate. She notes that she is no longer taking the water pill. She is trying not to drink tea at night due to making her urinate more during the night.     Hair loss  She denies any issues with hair loss. She states that her hair is thick.    Hypertension  She reports that she has been keeping track of her blood pressure since she stopped taking the water pill. She states that her blood pressure has been good. She reports that she did a 10 mile run and after the run she felt a little lightheaded. She knew her blood pressure had dropped. She states that it was very hot and she carried her water with her. She reports that she hydrated when she got home.    Allergies  She denies any issues with ragweed.    Health maintenance  The patient denies heartburn, chest pain, back pain, achy joints, constipation, diarrhea, pain with intercourse, or memory loss. The patient had her mammogram last month. She just had her Pap smear as well. She has never had a DEXA scan. She takes a multivitamin, Centrum Silver. She eats no red meat. She does eat a little chicken intermittently.      The following portions of the patient's history were reviewed and   updated as appropriate: allergies, current medications, past family history, past medical history, past social history, past surgical history and problem list.     Compared to one year ago, the patient feels her physical   health is better.    Compared to one year ago, the patient feels her mental   health is the same.    Recent  "Hospitalizations:  She was not admitted to the hospital during the last year.       Current Medical Providers:  Patient Care Team:  Asha Dorsey MD as PCP - General  Asha Dorsey MD as PCP - Family Medicine    Outpatient Medications Prior to Visit   Medication Sig Dispense Refill   • valsartan (DIOVAN) 160 MG tablet TAKE 1 TABLET BY MOUTH EVERY DAY 30 tablet 2     No facility-administered medications prior to visit.       No opioid medication identified on active medication list. I have reviewed chart for other potential  high risk medication/s and harmful drug interactions in the elderly.          Aspirin is not on active medication list.  Aspirin use is not indicated based on review of current medical condition/s. Risk of harm outweighs potential benefits.  .    Patient Active Problem List   Diagnosis   • Breast lump   • Carotid artery disease (HCC)   • Encounter for general adult medical examination without abnormal findings   • Encounter for screening for malignant neoplasm of colon   • Hyperkalemia   • Hyperlipidemia   • Hypertension   • Vitamin B12 deficiency   • Vitamin D deficiency disease     Advance Care Planning  Advance Directive is not on file.  ACP discussion was held with the patient during this visit. Patient does not have an advance directive, declines further assistance.          Objective      Vitals:    09/06/22 1432   BP: 128/80   Pulse: 64   Resp: 16   Temp: 97.8 °F (36.6 °C)   SpO2: 99%   Weight: 61.8 kg (136 lb 3.2 oz)   Height: 162.6 cm (64\")     Estimated body mass index is 23.38 kg/m² as calculated from the following:    Height as of this encounter: 162.6 cm (64\").    Weight as of this encounter: 61.8 kg (136 lb 3.2 oz).    BMI is within normal parameters. No other follow-up for BMI required.      Does the patient have evidence of cognitive impairment? No    Physical Exam  Vitals and nursing note reviewed.   Constitutional:       Appearance: Normal appearance. She is " well-developed.   HENT:      Head: Normocephalic and atraumatic.      Right Ear: Tympanic membrane normal.      Left Ear: Tympanic membrane normal.      Nose: No rhinorrhea.      Mouth/Throat:      Pharynx: No posterior oropharyngeal erythema.   Eyes:      Pupils: Pupils are equal, round, and reactive to light.   Cardiovascular:      Rate and Rhythm: Normal rate and regular rhythm.      Pulses: Normal pulses.      Heart sounds: Normal heart sounds. No murmur heard.  Pulmonary:      Effort: Pulmonary effort is normal.      Breath sounds: Normal breath sounds.   Abdominal:      General: Bowel sounds are normal. There is no distension.      Palpations: Abdomen is soft.   Musculoskeletal:         General: No tenderness.      Cervical back: Normal range of motion and neck supple.   Skin:     Capillary Refill: Capillary refill takes less than 2 seconds.   Neurological:      Mental Status: She is alert and oriented to person, place, and time.   Psychiatric:         Mood and Affect: Mood normal.         Behavior: Behavior normal.         Lab Results   Component Value Date    TRIG 91 08/30/2022    HDL 64 (H) 08/30/2022     (H) 08/30/2022    VLDL 16 08/30/2022         ECG 12 Lead    Date/Time: 9/6/2022 2:51 PM  Performed by: Asha Dorsey MD  Authorized by: Asha Dorsey MD   Comparison: not compared with previous ECG   Previous ECG: no previous ECG available  Rhythm: sinus rhythm    Clinical impression: normal ECG               HEALTH RISK ASSESSMENT    Smoking Status:  Social History     Tobacco Use   Smoking Status Never Smoker   Smokeless Tobacco Never Used     Alcohol Consumption:  Social History     Substance and Sexual Activity   Alcohol Use Never       Fall Risk Screen:    STEADI Fall Risk Assessment was completed, and patient is at LOW risk for falls.Assessment completed on:9/6/2022    Depression Screen:   PHQ-2/PHQ-9 Depression Screening 9/6/2022   Retired PHQ-9 Total Score -   Retired Total  Score -   Little Interest or Pleasure in Doing Things 0-->not at all   Feeling Down, Depressed or Hopeless 0-->not at all   PHQ-9: Brief Depression Severity Measure Score 0       Health Habits and Functional and Cognitive Screening:  Functional & Cognitive Status 9/6/2022   Do you have difficulty preparing food and eating? No   Do you have difficulty bathing yourself, getting dressed or grooming yourself? No   Do you have difficulty using the toilet? No   Do you have difficulty moving around from place to place? No   Do you have trouble with steps or getting out of a bed or a chair? No   Current Diet Well Balanced Diet   Dental Exam Up to date   Eye Exam Up to date   Exercise (times per week) 6 times per week   Current Exercises Include Walking;Running/Jogging;Weightlifting   Do you need help using the phone?  No   Are you deaf or do you have serious difficulty hearing?  No   Do you need help with transportation? No   Do you need help shopping? No   Do you need help preparing meals?  No   Do you need help with housework?  No   Do you need help with laundry? No   Do you need help taking your medications? No   Do you need help managing money? No   Do you ever drive or ride in a car without wearing a seat belt? No   Have you felt unusual stress, anger or loneliness in the last month? No   Who do you live with? Spouse   If you need help, do you have trouble finding someone available to you? Yes   Do you have difficulty concentrating, remembering or making decisions? No       Visual Acuity:    No exam data present    Age-appropriate Screening Schedule:  Refer to the list below for future screening recommendations based on patient's age, sex and/or medical conditions. Orders for these recommended tests are listed in the plan section. The patient has been provided with a written plan.    Health Maintenance   Topic Date Due   • DXA SCAN  Never done   • ZOSTER VACCINE (1 of 2) Never done   • INFLUENZA VACCINE  10/01/2022   •  PAP SMEAR  05/02/2023   • LIPID PANEL  08/30/2023   • MAMMOGRAM  08/08/2024   • TDAP/TD VACCINES (3 - Td or Tdap) 06/26/2028          Assessment & Plan   CMS Preventative Services Quick Reference  Risk Factors Identified During Encounter  None Identified  The above risks/problems have been discussed with the patient.  Pertinent information has been shared with the patient in the After Visit Summary.  Follow up plans and orders are seen below in the Assessment/Plan Section.    Diagnoses and all orders for this visit:    1. Encounter for general adult medical examination without abnormal findings (Primary)  Assessment & Plan:  Discussed all recommendations       2. Vitamin D deficiency disease    3. Primary hypertension    4. Familial hypercholesterolemia    5. Menopause  -     DEXA Bone Density Axial; Future    6. Welcome to Medicare preventive visit  -     ECG 12 Lead    Other orders  -     valsartan (DIOVAN) 160 MG tablet; Take 1 tablet by mouth Daily.  Dispense: 90 tablet; Refill: 2     Welcome to Medicare  An EKG was performed in the office today.    Health maintenance  An order for a DEXA scan was placed today.    Hypertension  A refill of losartan was sent to her pharmacy today.    Follow Up:   Return if symptoms worsen or fail to improve.     An After Visit Summary and PPPS were made available to the patient.    During this visit for their annual exam, we reviewed their personal history, social history and family history.  We went over their medications and all the recommended health maintenence items for their age group. They were given the opportunity to ask questions and discuss other concerns.           Transcribed from ambient dictation for Asha Dorsey MD by LESIA PILLAI.  09/06/22   19:25 EDT    Patient verbalized consent to the visit recording.  I have personally performed the services described in this document as transcribed by the above individual, and it is both accurate and complete.   Asha Dorsey MD  9/25/2022  19:12 EDT

## 2022-10-03 ENCOUNTER — HOSPITAL ENCOUNTER (OUTPATIENT)
Dept: BONE DENSITY | Facility: HOSPITAL | Age: 67
Discharge: HOME | End: 2022-10-03
Admitting: INTERNAL MEDICINE

## 2022-10-03 DIAGNOSIS — Z78.0 MENOPAUSE: ICD-10-CM

## 2022-10-03 PROCEDURE — 77080 DXA BONE DENSITY AXIAL: CPT

## 2022-11-09 RX ORDER — VALSARTAN 160 MG/1
160 TABLET ORAL DAILY
Qty: 90 TABLET | Refills: 2 | Status: SHIPPED | OUTPATIENT
Start: 2022-11-09

## 2022-12-09 ENCOUNTER — OFFICE VISIT (OUTPATIENT)
Dept: FAMILY MEDICINE CLINIC | Facility: CLINIC | Age: 67
End: 2022-12-09

## 2022-12-09 VITALS
TEMPERATURE: 97.7 F | DIASTOLIC BLOOD PRESSURE: 83 MMHG | HEART RATE: 72 BPM | RESPIRATION RATE: 15 BRPM | BODY MASS INDEX: 23.9 KG/M2 | OXYGEN SATURATION: 99 % | SYSTOLIC BLOOD PRESSURE: 161 MMHG | HEIGHT: 64 IN | WEIGHT: 140 LBS

## 2022-12-09 DIAGNOSIS — J10.1 INFLUENZA A: Primary | ICD-10-CM

## 2022-12-09 DIAGNOSIS — R05.1 ACUTE COUGH: ICD-10-CM

## 2022-12-09 LAB
EXPIRATION DATE: ABNORMAL
FLUAV AG UPPER RESP QL IA.RAPID: DETECTED
FLUBV AG UPPER RESP QL IA.RAPID: NOT DETECTED
INTERNAL CONTROL: ABNORMAL
Lab: ABNORMAL
SARS-COV-2 AG UPPER RESP QL IA.RAPID: NOT DETECTED

## 2022-12-09 PROCEDURE — 87428 SARSCOV & INF VIR A&B AG IA: CPT | Performed by: FAMILY MEDICINE

## 2022-12-09 PROCEDURE — 99213 OFFICE O/P EST LOW 20 MIN: CPT | Performed by: FAMILY MEDICINE

## 2022-12-09 RX ORDER — METHYLPREDNISOLONE 4 MG/1
TABLET ORAL
Qty: 21 TABLET | Refills: 0 | Status: SHIPPED | OUTPATIENT
Start: 2022-12-09 | End: 2022-12-10 | Stop reason: SDUPTHER

## 2022-12-09 RX ORDER — GUAIFENESIN 600 MG/1
TABLET, EXTENDED RELEASE ORAL
Qty: 30 TABLET | Refills: 1 | Status: SHIPPED | OUTPATIENT
Start: 2022-12-09 | End: 2022-12-10 | Stop reason: SDUPTHER

## 2022-12-09 RX ORDER — AZITHROMYCIN 500 MG/1
500 TABLET, FILM COATED ORAL DAILY
Qty: 3 TABLET | Refills: 0 | Status: SHIPPED | OUTPATIENT
Start: 2022-12-09 | End: 2022-12-10 | Stop reason: SDUPTHER

## 2022-12-09 NOTE — PROGRESS NOTES
Chief Complaint   Patient presents with   • Cough   • Nasal Congestion     HPI  Kami Mcneil is a 67 y.o. female that presents for   Chief Complaint   Patient presents with   • Cough   • Nasal Congestion     Cough: patient reports cough and congestion that started one week ago. Began having fever 2 days ago (Tmax 100) and had productive cough this AM. She reports ear pain, sinus pressure, and body aches. Body aches now improved. Has been taking Delsum and ibuprofen 400mg (just one dose).  was sick prior to this    Review of Systems   Constitutional: Positive for fever.   HENT: Positive for congestion, ear pain and sinus pressure.    Respiratory: Positive for cough.    Musculoskeletal: Positive for myalgias.     The following portions of the patient's history were reviewed and updated as appropriate: problem list, past medical history, past surgical history, allergies, current medication    Problem List Tab  Patient History Tab  Immunizations Tab  Medications Tab  Chart Review Tab  Care Everywhere Tab  Synopsis Tab    PE  Vitals:    12/09/22 1626   BP: 161/83   Pulse: 72   Resp: 15   Temp: 97.7 °F (36.5 °C)   SpO2: 99%     Body mass index is 24.03 kg/m².  General: Well nourished, NAD  Head: AT/NC  Eyes: EOMI, anicteric sclera  ENT: MMM w/o erythema. TM clear bilaterally  Neck: Supple, no thyromegaly or LAD  Resp: CTAB, SCR, BS equal  CV: RRR w/o m/r/g; 2+ pulses  GI: Soft, NT/ND, +BS  MSK: FROM, no deformity, no edema  Skin: Warm, dry, intact  Neuro: Alert and oriented. No focal deficits  Psych: Appropriate mood and affect    Imaging  DEXA Bone Density Axial    Result Date: 10/3/2022  Osteopenia.  Copies of the computerized graph sheet can be obtained in the [facility] PACS or from Epic via the [employer] information department.  Electronically Signed By-Kami Iverson MD On:10/3/2022 11:08 AM This report was finalized on [id number] by  Kami Iverson MD.      Assessment & Plan   Kami Mcneil  is a 67 y.o. female that presents for   Chief Complaint   Patient presents with   • Cough   • Nasal Congestion     Diagnoses and all orders for this visit:    1. Influenza A (Primary): Test positive for flu a today.  Likely explains body aches and fever.  Symptomatic treatment as below  -    methylPREDNISolone (MEDROL) 4 MG dose pack; Take as directed on package instructions. Take 6 tablets on first day and one fewer tablet on each subsequent day  Dispense: 21 tablet; Refill: 0  -    guaiFENesin (Mucinex) 600 MG 12 hr tablet; Take 2 tablets twice daily for congestion  Dispense: 30 tablet; Refill: 1  -    azithromycin (ZITHROMAX) 500 MG tablet; Take 1 tablet by mouth Daily.  Dispense: 3 tablet; Refill: 0  - Consider Stahist if blood pressure improves.  Patient reports whitecoat hypertension    2. Acute cough  -     POCT SARS-CoV-2 Antigen MARY + Flu  -     methylPREDNISolone (MEDROL) 4 MG dose pack; Take as directed on package instructions. Take 6 tablets on first day and one fewer tablet on each subsequent day  Dispense: 21 tablet; Refill: 0  -     guaiFENesin (Mucinex) 600 MG 12 hr tablet; Take 2 tablets twice daily for congestion  Dispense: 30 tablet; Refill: 1  -     azithromycin (ZITHROMAX) 500 MG tablet; Take 1 tablet by mouth Daily.  Dispense: 3 tablet; Refill: 0     Return if symptoms worsen or fail to improve.

## 2022-12-10 ENCOUNTER — DOCUMENTATION (OUTPATIENT)
Dept: FAMILY MEDICINE CLINIC | Facility: CLINIC | Age: 67
End: 2022-12-10

## 2022-12-10 DIAGNOSIS — R05.1 ACUTE COUGH: Primary | ICD-10-CM

## 2022-12-10 DIAGNOSIS — J10.1 INFLUENZA A: ICD-10-CM

## 2022-12-10 RX ORDER — AZITHROMYCIN 500 MG/1
500 TABLET, FILM COATED ORAL DAILY
Qty: 3 TABLET | Refills: 0 | Status: SHIPPED | OUTPATIENT
Start: 2022-12-10

## 2022-12-10 RX ORDER — METHYLPREDNISOLONE 4 MG/1
TABLET ORAL
Qty: 21 TABLET | Refills: 0 | Status: SHIPPED | OUTPATIENT
Start: 2022-12-10

## 2022-12-10 RX ORDER — GUAIFENESIN 600 MG/1
TABLET, EXTENDED RELEASE ORAL
Qty: 30 TABLET | Refills: 1 | Status: SHIPPED | OUTPATIENT
Start: 2022-12-10

## 2023-04-14 ENCOUNTER — TELEPHONE (OUTPATIENT)
Dept: FAMILY MEDICINE CLINIC | Facility: CLINIC | Age: 68
End: 2023-04-14
Payer: MEDICARE

## 2023-04-14 NOTE — TELEPHONE ENCOUNTER
Caller: Kami Mcneil    Relationship: Self    Best call back number: *  Kami Mcneil (Self) [phone] (Mobile)         What was the call regarding: PATIENT CALLED TO SCHEDULE APPT WITH DR NOLAN IN September WHEN HER WELLNESS VISIT IS DUE     SHE IS DUE FOR INITIAL WELLNESS/ BUT WILL ALSO BE ESTABLISHING CARE WITH DR NOLAN     IF THE WELLNESS VISIT NEEDS TO BE ON ANOTHER APPT, PLEASE ADVISE     Do you require a callback: IF NEEDED

## 2023-08-28 ENCOUNTER — HOSPITAL ENCOUNTER (OUTPATIENT)
Dept: MAMMOGRAPHY | Facility: HOSPITAL | Age: 68
Discharge: HOME | End: 2023-08-28
Admitting: OBSTETRICS & GYNECOLOGY
Payer: MEDICARE

## 2023-08-28 DIAGNOSIS — Z12.31 BREAST CANCER SCREENING BY MAMMOGRAM: ICD-10-CM

## 2023-08-28 PROCEDURE — 77067 SCR MAMMO BI INCL CAD: CPT

## 2023-08-28 PROCEDURE — 77063 BREAST TOMOSYNTHESIS BI: CPT

## 2023-09-11 ENCOUNTER — OFFICE VISIT (OUTPATIENT)
Dept: FAMILY MEDICINE CLINIC | Facility: CLINIC | Age: 68
End: 2023-09-11
Payer: MEDICARE

## 2023-09-11 ENCOUNTER — LAB (OUTPATIENT)
Dept: FAMILY MEDICINE CLINIC | Facility: CLINIC | Age: 68
End: 2023-09-11
Payer: MEDICARE

## 2023-09-11 ENCOUNTER — TELEPHONE (OUTPATIENT)
Dept: FAMILY MEDICINE CLINIC | Facility: CLINIC | Age: 68
End: 2023-09-11

## 2023-09-11 VITALS
RESPIRATION RATE: 18 BRPM | SYSTOLIC BLOOD PRESSURE: 121 MMHG | WEIGHT: 140.4 LBS | HEART RATE: 67 BPM | BODY MASS INDEX: 23.97 KG/M2 | OXYGEN SATURATION: 97 % | DIASTOLIC BLOOD PRESSURE: 82 MMHG | HEIGHT: 64 IN

## 2023-09-11 DIAGNOSIS — R51.9 CHRONIC RIGHT-SIDED HEADACHES: ICD-10-CM

## 2023-09-11 DIAGNOSIS — E55.9 VITAMIN D DEFICIENCY DISEASE: ICD-10-CM

## 2023-09-11 DIAGNOSIS — I10 PRIMARY HYPERTENSION: ICD-10-CM

## 2023-09-11 DIAGNOSIS — Z00.00 MEDICARE ANNUAL WELLNESS VISIT, SUBSEQUENT: Primary | ICD-10-CM

## 2023-09-11 DIAGNOSIS — E78.2 MIXED HYPERLIPIDEMIA: ICD-10-CM

## 2023-09-11 DIAGNOSIS — G89.29 CHRONIC RIGHT-SIDED HEADACHES: ICD-10-CM

## 2023-09-11 DIAGNOSIS — Z82.49 FAMILY HISTORY OF ANEURYSM OF BLOOD VESSEL OF BRAIN: ICD-10-CM

## 2023-09-11 LAB
25(OH)D3 SERPL-MCNC: 42.4 NG/ML (ref 30–100)
ALBUMIN SERPL-MCNC: 4.7 G/DL (ref 3.5–5.2)
ALBUMIN/GLOB SERPL: 2.1 G/DL
ALP SERPL-CCNC: 79 U/L (ref 39–117)
ALT SERPL W P-5'-P-CCNC: 19 U/L (ref 1–33)
ANION GAP SERPL CALCULATED.3IONS-SCNC: 12 MMOL/L (ref 5–15)
AST SERPL-CCNC: 29 U/L (ref 1–32)
BILIRUB SERPL-MCNC: 0.4 MG/DL (ref 0–1.2)
BUN SERPL-MCNC: 16 MG/DL (ref 8–23)
BUN/CREAT SERPL: 17.8 (ref 7–25)
CALCIUM SPEC-SCNC: 9.9 MG/DL (ref 8.6–10.5)
CHLORIDE SERPL-SCNC: 105 MMOL/L (ref 98–107)
CHOLEST SERPL-MCNC: 251 MG/DL (ref 0–200)
CO2 SERPL-SCNC: 25 MMOL/L (ref 22–29)
CREAT SERPL-MCNC: 0.9 MG/DL (ref 0.57–1)
DEPRECATED RDW RBC AUTO: 40.7 FL (ref 37–54)
EGFRCR SERPLBLD CKD-EPI 2021: 70.7 ML/MIN/1.73
ERYTHROCYTE [DISTWIDTH] IN BLOOD BY AUTOMATED COUNT: 12.3 % (ref 12.3–15.4)
GLOBULIN UR ELPH-MCNC: 2.2 GM/DL
GLUCOSE SERPL-MCNC: 75 MG/DL (ref 65–99)
HBA1C MFR BLD: 5.3 % (ref 4.8–5.6)
HCT VFR BLD AUTO: 42.8 % (ref 34–46.6)
HDLC SERPL-MCNC: 72 MG/DL (ref 40–60)
HGB BLD-MCNC: 13.9 G/DL (ref 12–15.9)
LDLC SERPL CALC-MCNC: 160 MG/DL (ref 0–100)
LDLC/HDLC SERPL: 2.19 {RATIO}
MCH RBC QN AUTO: 29.3 PG (ref 26.6–33)
MCHC RBC AUTO-ENTMCNC: 32.5 G/DL (ref 31.5–35.7)
MCV RBC AUTO: 90.3 FL (ref 79–97)
PLATELET # BLD AUTO: 267 10*3/MM3 (ref 140–450)
PMV BLD AUTO: 11.7 FL (ref 6–12)
POTASSIUM SERPL-SCNC: 4 MMOL/L (ref 3.5–5.2)
PROT SERPL-MCNC: 6.9 G/DL (ref 6–8.5)
RBC # BLD AUTO: 4.74 10*6/MM3 (ref 3.77–5.28)
SODIUM SERPL-SCNC: 142 MMOL/L (ref 136–145)
TRIGL SERPL-MCNC: 107 MG/DL (ref 0–150)
TSH SERPL DL<=0.05 MIU/L-ACNC: 1.24 UIU/ML (ref 0.27–4.2)
VIT B12 BLD-MCNC: 372 PG/ML (ref 211–946)
VLDLC SERPL-MCNC: 19 MG/DL (ref 5–40)
WBC NRBC COR # BLD: 12.92 10*3/MM3 (ref 3.4–10.8)

## 2023-09-11 PROCEDURE — 36415 COLL VENOUS BLD VENIPUNCTURE: CPT | Performed by: STUDENT IN AN ORGANIZED HEALTH CARE EDUCATION/TRAINING PROGRAM

## 2023-09-11 PROCEDURE — 80053 COMPREHEN METABOLIC PANEL: CPT | Performed by: STUDENT IN AN ORGANIZED HEALTH CARE EDUCATION/TRAINING PROGRAM

## 2023-09-11 PROCEDURE — 85027 COMPLETE CBC AUTOMATED: CPT | Performed by: STUDENT IN AN ORGANIZED HEALTH CARE EDUCATION/TRAINING PROGRAM

## 2023-09-11 PROCEDURE — 83036 HEMOGLOBIN GLYCOSYLATED A1C: CPT | Performed by: STUDENT IN AN ORGANIZED HEALTH CARE EDUCATION/TRAINING PROGRAM

## 2023-09-11 PROCEDURE — 82607 VITAMIN B-12: CPT | Performed by: STUDENT IN AN ORGANIZED HEALTH CARE EDUCATION/TRAINING PROGRAM

## 2023-09-11 PROCEDURE — 80061 LIPID PANEL: CPT | Performed by: STUDENT IN AN ORGANIZED HEALTH CARE EDUCATION/TRAINING PROGRAM

## 2023-09-11 PROCEDURE — 82306 VITAMIN D 25 HYDROXY: CPT | Performed by: STUDENT IN AN ORGANIZED HEALTH CARE EDUCATION/TRAINING PROGRAM

## 2023-09-11 PROCEDURE — 84443 ASSAY THYROID STIM HORMONE: CPT | Performed by: STUDENT IN AN ORGANIZED HEALTH CARE EDUCATION/TRAINING PROGRAM

## 2023-09-11 NOTE — PROGRESS NOTES
The ABCs of the Annual Wellness Visit  Subsequent Medicare Wellness Visit    Subjective    Kami Mcneil is a 68 y.o. female who presents for a Subsequent Medicare Wellness Visit.    The following portions of the patient's history were reviewed and   updated as appropriate: allergies, current medications, past family history, past medical history, past social history, past surgical history, and problem list.    Compared to one year ago, the patient feels her physical   health is the same.    Compared to one year ago, the patient feels her mental   health is the same.    Recent Hospitalizations:  She was not admitted to the hospital during the last year.     Current Medical Providers:  Patient Care Team:  Les Ruvalcaba,  as PCP - General (Family Medicine)    Outpatient Medications Prior to Visit   Medication Sig Dispense Refill    valsartan (DIOVAN) 160 MG tablet Take 1 tablet by mouth Daily. 90 tablet 2    azithromycin (ZITHROMAX) 500 MG tablet Take 1 tablet by mouth Daily. (Patient not taking: Reported on 9/11/2023) 3 tablet 0    guaiFENesin (Mucinex) 600 MG 12 hr tablet Take 2 tablets twice daily for congestion (Patient not taking: Reported on 9/11/2023) 30 tablet 1    methylPREDNISolone (MEDROL) 4 MG dose pack Take as directed on package instructions. Take 6 tablets on first day and one fewer tablet on each subsequent day (Patient not taking: Reported on 9/11/2023) 21 tablet 0     No facility-administered medications prior to visit.       No opioid medication identified on active medication list. I have reviewed chart for other potential  high risk medication/s and harmful drug interactions in the elderly.        Aspirin is not on active medication list.  Aspirin use is not indicated based on review of current medical condition/s. Risk of harm outweighs potential benefits.  .    Patient Active Problem List   Diagnosis    Breast lump    Carotid artery disease    Encounter for general adult medical  "examination without abnormal findings    Encounter for screening for malignant neoplasm of colon    Hyperkalemia    Hyperlipidemia    Hypertension    Vitamin B12 deficiency    Vitamin D deficiency disease     Advance Care Planning   Advance Care Planning     Advance Directive is not on file.  ACP discussion was held with the patient during this visit. Patient does not have an advance directive, declines further assistance.     Objective    Vitals:    23 0854   BP: 121/82   Pulse: 67   Resp: 18   SpO2: 97%   Weight: 63.7 kg (140 lb 6.4 oz)   Height: 162.6 cm (64\")     Estimated body mass index is 24.1 kg/m² as calculated from the following:    Height as of this encounter: 162.6 cm (64\").    Weight as of this encounter: 63.7 kg (140 lb 6.4 oz).    BMI is within normal parameters. No other follow-up for BMI required.      Does the patient have evidence of cognitive impairment? No          HEALTH RISK ASSESSMENT    Smoking Status:  Social History     Tobacco Use   Smoking Status Never   Smokeless Tobacco Never     Alcohol Consumption:  Social History     Substance and Sexual Activity   Alcohol Use Never     Fall Risk Screen:    STEADI Fall Risk Assessment was completed, and patient is at LOW risk for falls.Assessment completed on:2023    Depression Screenin/11/2023     8:51 AM   PHQ-2/PHQ-9 Depression Screening   Little Interest or Pleasure in Doing Things 0-->not at all   Feeling Down, Depressed or Hopeless 0-->not at all   PHQ-9: Brief Depression Severity Measure Score 0       Health Habits and Functional and Cognitive Screenin/11/2023     8:00 AM   Functional & Cognitive Status   Do you have difficulty preparing food and eating? No   Do you have difficulty bathing yourself, getting dressed or grooming yourself? No   Do you have difficulty using the toilet? No   Do you have difficulty moving around from place to place? No   Do you have trouble with steps or getting out of a bed or a chair? " No   Current Diet Well Balanced Diet   Dental Exam Up to date   Eye Exam Up to date   Exercise (times per week) 6 times per week   Current Exercises Include Running/Jogging   Do you need help using the phone?  No   Are you deaf or do you have serious difficulty hearing?  No   Do you need help to go to places out of walking distance? No   Do you need help shopping? No   Do you need help preparing meals?  No   Do you need help with housework?  No   Do you need help with laundry? No   Do you need help taking your medications? No   Do you need help managing money? No   Do you ever drive or ride in a car without wearing a seat belt? No       Age-appropriate Screening Schedule:  Refer to the list below for future screening recommendations based on patient's age, sex and/or medical conditions. Orders for these recommended tests are listed in the plan section. The patient has been provided with a written plan.    Health Maintenance   Topic Date Due    ZOSTER VACCINE (1 of 2) Never done    COVID-19 Vaccine (2 - Booster for Marcelino series) 05/07/2021    Pneumococcal Vaccine 65+ (1 - PCV) Never done    LIPID PANEL  08/30/2023    ANNUAL WELLNESS VISIT  09/06/2023    INFLUENZA VACCINE  10/01/2023    COLORECTAL CANCER SCREENING  07/05/2024    DXA SCAN  10/03/2024    PAP SMEAR  08/01/2025    MAMMOGRAM  08/28/2025    TDAP/TD VACCINES (3 - Td or Tdap) 06/26/2028    HEPATITIS C SCREENING  Discontinued                  CMS Preventative Services Quick Reference  Risk Factors Identified During Encounter  Immunizations Discussed/Encouraged: Prevnar 20 (Pneumococcal 20-valent conjugate) and Shingrix  The above risks/problems have been discussed with the patient.  Pertinent information has been shared with the patient in the After Visit Summary.  An After Visit Summary and PPPS were made available to the patient.    Follow Up:   Next Medicare Wellness visit to be scheduled in 1 year.       Additional E&M Note during same encounter  "follows:  Patient has multiple medical problems which are significant and separately identifiable that require additional work above and beyond the Medicare Wellness Visit.      Chief Complaint  Establish Care and Medicare Wellness-subsequent    Subjective        HPI  Kami Mcneil is also being seen today for family history of cerebral aneurysms.  Her father  of a basal ganglia hemorrhage.  Her sister has migraines and recently had an MRA of her brain which showed aneurysm.  Kami has HTN and HLD.  She does get some R sided neck pains that will shoot up into her head.  There is concern given her vascular risk factors (HTN, HLD) and strong family history of cerebral aneurysms that she is at risk.      Objective   Vital Signs:  /82   Pulse 67   Resp 18   Ht 162.6 cm (64\")   Wt 63.7 kg (140 lb 6.4 oz)   SpO2 97%   BMI 24.10 kg/m²     GEN: In no acute distress, non toxic appearing  HEENT: Pupils equal and reactive to light, sclera clear. Mucous membranes moist. Oropharynx without erythema or exudate. No cervical or submandibular lymphadenopathy.  CV: Regular rate and rhythm, no murmurs, 2+ peripheral pulses, No extremity edema.   RESP: Lungs clear to auscultation anteriorly and posteriorly in all lung fields bilaterally.  NEURO: AAO to person, place, and time. CN 2-12 intact grossly.   PSYCH: Affect normal, insight fair              Assessment and Plan   Diagnoses and all orders for this visit:    1. Medicare annual wellness visit, subsequent (Primary)  Overall reassuring exam.  BP at goal today, continue valsartan 160 mg daily.  Blood wok as below.  UTD on mammogram, pap, and colon ca screening.  Repeat cologuard next year.  Check B12 as she does not eat red meat.  Vitamin D deficiency in the past, recheck today.  Continue regular physical activity.  Continue healthy diet with plenty of fruits and vegetables.  Next wellness in 1 yr, f/u sooner prn.    -     CBC (No Diff)  -     Comprehensive " Metabolic Panel  -     Hemoglobin A1c  -     Lipid Panel  -     TSH  -     Vitamin D,25-Hydroxy  -     Vitamin B12    2. Vitamin D deficiency disease  -     Vitamin D,25-Hydroxy    3. Primary hypertension  At goal, continue valsartan 160 mg and lifestyle modifications.    4. Mixed hyperlipidemia  Recheck today.  Continue healthy diet and regular exercise.    5. Family history of aneurysm of blood vessel of brain  Given strong family history of brain aneurysm (father and sister), R sided headaches, as well as Kami's risk factors of HTN and HLD an MRA of her head is warranted to screen for aneurysm as she is high risk.      6. Chronic right-sided headaches  See above      Follow Up   Return in about 1 year (around 9/11/2024) for Medicare Wellness.  Patient was given instructions and counseling regarding her condition or for health maintenance advice. Please see specific information pulled into the AVS if appropriate.

## 2023-09-11 NOTE — TELEPHONE ENCOUNTER
PATIENT WAS SEEN TODAY AND DR. NOLAN  TOLD HER SHE WOULD HAVE AN MRA. IN THE CHART ITS AN MRI. SHE IS CONCERNED ABOUT THIS.     PLEASE CALL AND ADVISE [phone]

## 2023-09-12 ENCOUNTER — PATIENT MESSAGE (OUTPATIENT)
Dept: FAMILY MEDICINE CLINIC | Facility: CLINIC | Age: 68
End: 2023-09-12
Payer: MEDICARE

## 2023-09-12 DIAGNOSIS — E78.2 MIXED HYPERLIPIDEMIA: Primary | ICD-10-CM

## 2023-09-12 DIAGNOSIS — D72.829 LEUKOCYTOSIS, UNSPECIFIED TYPE: ICD-10-CM

## 2023-09-12 NOTE — TELEPHONE ENCOUNTER
HUB TO SHARE: SENT FOLLOWING [facility] MESSAGE. Per Dr Ruvalcaba  It is an MRI Angiogram which is an MRA.     Thank you.

## 2023-09-13 NOTE — TELEPHONE ENCOUNTER
From: Kami Mcneil  To: Les Ruvalcaba  Sent: 9/12/2023 11:01 AM EDT  Subject: MRA with contrast     Dr. Ruvalcaba, I just spoke to [facility] to try to schedule my MRA with contrast. I was told by the  that they do not do an MRA with contrast. They only do an MRA without contrast. If that will work for what you need to look at on my brain, they need you to resubmit the order as an MRA without contrast. Or you will need to resubmit the MRAwith contrast to another facility that does this.  Thank you   Kami Mcneil

## 2023-10-11 ENCOUNTER — HOSPITAL ENCOUNTER (OUTPATIENT)
Dept: MRI IMAGING | Facility: HOSPITAL | Age: 68
Discharge: HOME | End: 2023-10-11
Admitting: STUDENT IN AN ORGANIZED HEALTH CARE EDUCATION/TRAINING PROGRAM
Payer: MEDICARE

## 2023-10-11 DIAGNOSIS — G89.29 CHRONIC RIGHT-SIDED HEADACHES: ICD-10-CM

## 2023-10-11 DIAGNOSIS — Z82.49 FAMILY HISTORY OF ANEURYSM OF BLOOD VESSEL OF BRAIN: ICD-10-CM

## 2023-10-11 DIAGNOSIS — E78.2 MIXED HYPERLIPIDEMIA: ICD-10-CM

## 2023-10-11 DIAGNOSIS — R51.9 CHRONIC RIGHT-SIDED HEADACHES: ICD-10-CM

## 2023-10-11 DIAGNOSIS — I10 PRIMARY HYPERTENSION: ICD-10-CM

## 2023-10-11 PROCEDURE — 70544 MR ANGIOGRAPHY HEAD W/O DYE: CPT

## 2023-11-01 RX ORDER — VALSARTAN 160 MG/1
160 TABLET ORAL DAILY
Qty: 90 TABLET | Refills: 2 | Status: SHIPPED | OUTPATIENT
Start: 2023-11-01

## 2023-12-13 ENCOUNTER — LAB (OUTPATIENT)
Dept: FAMILY MEDICINE CLINIC | Facility: CLINIC | Age: 68
End: 2023-12-13
Payer: MEDICARE

## 2023-12-13 DIAGNOSIS — E78.2 MIXED HYPERLIPIDEMIA: ICD-10-CM

## 2023-12-13 DIAGNOSIS — D72.829 LEUKOCYTOSIS, UNSPECIFIED TYPE: ICD-10-CM

## 2023-12-13 LAB
CHOLEST SERPL-MCNC: 240 MG/DL (ref 0–200)
DEPRECATED RDW RBC AUTO: 39.8 FL (ref 37–54)
ERYTHROCYTE [DISTWIDTH] IN BLOOD BY AUTOMATED COUNT: 12.3 % (ref 12.3–15.4)
HCT VFR BLD AUTO: 42.3 % (ref 34–46.6)
HDLC SERPL-MCNC: 77 MG/DL (ref 40–60)
HGB BLD-MCNC: 13.3 G/DL (ref 12–15.9)
LDLC SERPL CALC-MCNC: 150 MG/DL (ref 0–100)
LDLC/HDLC SERPL: 1.92 {RATIO}
MCH RBC QN AUTO: 28.1 PG (ref 26.6–33)
MCHC RBC AUTO-ENTMCNC: 31.4 G/DL (ref 31.5–35.7)
MCV RBC AUTO: 89.4 FL (ref 79–97)
PLATELET # BLD AUTO: 268 10*3/MM3 (ref 140–450)
PMV BLD AUTO: 11.3 FL (ref 6–12)
RBC # BLD AUTO: 4.73 10*6/MM3 (ref 3.77–5.28)
TRIGL SERPL-MCNC: 76 MG/DL (ref 0–150)
VLDLC SERPL-MCNC: 13 MG/DL (ref 5–40)
WBC NRBC COR # BLD AUTO: 10.5 10*3/MM3 (ref 3.4–10.8)

## 2023-12-13 PROCEDURE — 85027 COMPLETE CBC AUTOMATED: CPT | Performed by: STUDENT IN AN ORGANIZED HEALTH CARE EDUCATION/TRAINING PROGRAM

## 2023-12-13 PROCEDURE — 80061 LIPID PANEL: CPT | Performed by: STUDENT IN AN ORGANIZED HEALTH CARE EDUCATION/TRAINING PROGRAM

## 2023-12-13 PROCEDURE — 36415 COLL VENOUS BLD VENIPUNCTURE: CPT

## 2024-06-14 ENCOUNTER — TRANSCRIBE ORDERS (OUTPATIENT)
Dept: ADMINISTRATIVE | Facility: HOSPITAL | Age: 69
End: 2024-06-14
Payer: MEDICARE

## 2024-06-14 DIAGNOSIS — Z12.31 VISIT FOR SCREENING MAMMOGRAM: Primary | ICD-10-CM

## 2024-07-24 RX ORDER — VALSARTAN 160 MG/1
160 TABLET ORAL DAILY
Qty: 90 TABLET | Refills: 0 | Status: SHIPPED | OUTPATIENT
Start: 2024-07-24

## 2024-08-30 ENCOUNTER — HOSPITAL ENCOUNTER (OUTPATIENT)
Dept: MAMMOGRAPHY | Facility: HOSPITAL | Age: 69
Discharge: HOME | End: 2024-08-30
Admitting: OBSTETRICS & GYNECOLOGY
Payer: MEDICARE

## 2024-08-30 DIAGNOSIS — Z12.31 VISIT FOR SCREENING MAMMOGRAM: ICD-10-CM

## 2024-08-30 PROCEDURE — 77067 SCR MAMMO BI INCL CAD: CPT

## 2024-08-30 PROCEDURE — 77063 BREAST TOMOSYNTHESIS BI: CPT

## 2024-09-05 ENCOUNTER — TRANSCRIBE ORDERS (OUTPATIENT)
Dept: ADMINISTRATIVE | Facility: HOSPITAL | Age: 69
End: 2024-09-05
Payer: MEDICARE

## 2024-09-05 DIAGNOSIS — Z78.0 POSTMENOPAUSAL STATE: Primary | ICD-10-CM

## 2024-09-16 ENCOUNTER — OFFICE VISIT (OUTPATIENT)
Dept: FAMILY MEDICINE CLINIC | Facility: CLINIC | Age: 69
End: 2024-09-16
Payer: MEDICARE

## 2024-09-16 ENCOUNTER — LAB (OUTPATIENT)
Dept: FAMILY MEDICINE CLINIC | Facility: CLINIC | Age: 69
End: 2024-09-16
Payer: MEDICARE

## 2024-09-16 VITALS
HEIGHT: 64 IN | WEIGHT: 141.6 LBS | OXYGEN SATURATION: 100 % | HEART RATE: 67 BPM | DIASTOLIC BLOOD PRESSURE: 80 MMHG | RESPIRATION RATE: 16 BRPM | SYSTOLIC BLOOD PRESSURE: 140 MMHG | BODY MASS INDEX: 24.17 KG/M2

## 2024-09-16 DIAGNOSIS — Z23 NEED FOR VACCINATION: ICD-10-CM

## 2024-09-16 DIAGNOSIS — E53.8 VITAMIN B12 DEFICIENCY: ICD-10-CM

## 2024-09-16 DIAGNOSIS — E55.9 VITAMIN D DEFICIENCY DISEASE: ICD-10-CM

## 2024-09-16 DIAGNOSIS — Z12.11 COLON CANCER SCREENING: ICD-10-CM

## 2024-09-16 DIAGNOSIS — D72.829 LEUKOCYTOSIS, UNSPECIFIED TYPE: ICD-10-CM

## 2024-09-16 DIAGNOSIS — I10 PRIMARY HYPERTENSION: ICD-10-CM

## 2024-09-16 DIAGNOSIS — Z00.00 MEDICARE ANNUAL WELLNESS VISIT, SUBSEQUENT: Primary | ICD-10-CM

## 2024-09-16 LAB
25(OH)D3 SERPL-MCNC: 66 NG/ML (ref 30–100)
ALBUMIN SERPL-MCNC: 4.5 G/DL (ref 3.5–5.2)
ALBUMIN/GLOB SERPL: 2 G/DL
ALP SERPL-CCNC: 82 U/L (ref 39–117)
ALT SERPL W P-5'-P-CCNC: 22 U/L (ref 1–33)
ANION GAP SERPL CALCULATED.3IONS-SCNC: 9 MMOL/L (ref 5–15)
AST SERPL-CCNC: 27 U/L (ref 1–32)
BILIRUB SERPL-MCNC: 0.5 MG/DL (ref 0–1.2)
BUN SERPL-MCNC: 14 MG/DL (ref 8–23)
BUN/CREAT SERPL: 17.7 (ref 7–25)
CALCIUM SPEC-SCNC: 10.1 MG/DL (ref 8.6–10.5)
CHLORIDE SERPL-SCNC: 105 MMOL/L (ref 98–107)
CHOLEST SERPL-MCNC: 235 MG/DL (ref 0–200)
CO2 SERPL-SCNC: 28 MMOL/L (ref 22–29)
CREAT SERPL-MCNC: 0.79 MG/DL (ref 0.57–1)
DEPRECATED RDW RBC AUTO: 44.4 FL (ref 37–54)
EGFRCR SERPLBLD CKD-EPI 2021: 82.1 ML/MIN/1.73
ERYTHROCYTE [DISTWIDTH] IN BLOOD BY AUTOMATED COUNT: 13.2 % (ref 12.3–15.4)
GLOBULIN UR ELPH-MCNC: 2.3 GM/DL
GLUCOSE SERPL-MCNC: 67 MG/DL (ref 65–99)
HBA1C MFR BLD: 5 % (ref 4.8–5.6)
HCT VFR BLD AUTO: 42.3 % (ref 34–46.6)
HDLC SERPL-MCNC: 73 MG/DL (ref 40–60)
HGB BLD-MCNC: 13.5 G/DL (ref 12–15.9)
LDLC SERPL CALC-MCNC: 146 MG/DL (ref 0–100)
LDLC/HDLC SERPL: 1.96 {RATIO}
MCH RBC QN AUTO: 29.7 PG (ref 26.6–33)
MCHC RBC AUTO-ENTMCNC: 31.9 G/DL (ref 31.5–35.7)
MCV RBC AUTO: 93.2 FL (ref 79–97)
PLATELET # BLD AUTO: 255 10*3/MM3 (ref 140–450)
PMV BLD AUTO: 11.4 FL (ref 6–12)
POTASSIUM SERPL-SCNC: 3.8 MMOL/L (ref 3.5–5.2)
PROT SERPL-MCNC: 6.8 G/DL (ref 6–8.5)
RBC # BLD AUTO: 4.54 10*6/MM3 (ref 3.77–5.28)
SODIUM SERPL-SCNC: 142 MMOL/L (ref 136–145)
TRIGL SERPL-MCNC: 94 MG/DL (ref 0–150)
TSH SERPL DL<=0.05 MIU/L-ACNC: 0.95 UIU/ML (ref 0.27–4.2)
VIT B12 BLD-MCNC: 244 PG/ML (ref 211–946)
VLDLC SERPL-MCNC: 16 MG/DL (ref 5–40)
WBC NRBC COR # BLD AUTO: 14.9 10*3/MM3 (ref 3.4–10.8)

## 2024-09-16 PROCEDURE — 82607 VITAMIN B-12: CPT | Performed by: STUDENT IN AN ORGANIZED HEALTH CARE EDUCATION/TRAINING PROGRAM

## 2024-09-16 PROCEDURE — 80053 COMPREHEN METABOLIC PANEL: CPT | Performed by: STUDENT IN AN ORGANIZED HEALTH CARE EDUCATION/TRAINING PROGRAM

## 2024-09-16 PROCEDURE — 1160F RVW MEDS BY RX/DR IN RCRD: CPT | Performed by: STUDENT IN AN ORGANIZED HEALTH CARE EDUCATION/TRAINING PROGRAM

## 2024-09-16 PROCEDURE — 80061 LIPID PANEL: CPT | Performed by: STUDENT IN AN ORGANIZED HEALTH CARE EDUCATION/TRAINING PROGRAM

## 2024-09-16 PROCEDURE — 84443 ASSAY THYROID STIM HORMONE: CPT | Performed by: STUDENT IN AN ORGANIZED HEALTH CARE EDUCATION/TRAINING PROGRAM

## 2024-09-16 PROCEDURE — 83036 HEMOGLOBIN GLYCOSYLATED A1C: CPT | Performed by: STUDENT IN AN ORGANIZED HEALTH CARE EDUCATION/TRAINING PROGRAM

## 2024-09-16 PROCEDURE — 36415 COLL VENOUS BLD VENIPUNCTURE: CPT | Performed by: STUDENT IN AN ORGANIZED HEALTH CARE EDUCATION/TRAINING PROGRAM

## 2024-09-16 PROCEDURE — 85027 COMPLETE CBC AUTOMATED: CPT | Performed by: STUDENT IN AN ORGANIZED HEALTH CARE EDUCATION/TRAINING PROGRAM

## 2024-09-16 PROCEDURE — 1126F AMNT PAIN NOTED NONE PRSNT: CPT | Performed by: STUDENT IN AN ORGANIZED HEALTH CARE EDUCATION/TRAINING PROGRAM

## 2024-09-16 PROCEDURE — 1170F FXNL STATUS ASSESSED: CPT | Performed by: STUDENT IN AN ORGANIZED HEALTH CARE EDUCATION/TRAINING PROGRAM

## 2024-09-16 PROCEDURE — 1159F MED LIST DOCD IN RCRD: CPT | Performed by: STUDENT IN AN ORGANIZED HEALTH CARE EDUCATION/TRAINING PROGRAM

## 2024-09-16 PROCEDURE — 82306 VITAMIN D 25 HYDROXY: CPT | Performed by: STUDENT IN AN ORGANIZED HEALTH CARE EDUCATION/TRAINING PROGRAM

## 2024-09-16 PROCEDURE — G0439 PPPS, SUBSEQ VISIT: HCPCS | Performed by: STUDENT IN AN ORGANIZED HEALTH CARE EDUCATION/TRAINING PROGRAM

## 2024-09-16 PROCEDURE — 3077F SYST BP >= 140 MM HG: CPT | Performed by: STUDENT IN AN ORGANIZED HEALTH CARE EDUCATION/TRAINING PROGRAM

## 2024-09-16 PROCEDURE — 3079F DIAST BP 80-89 MM HG: CPT | Performed by: STUDENT IN AN ORGANIZED HEALTH CARE EDUCATION/TRAINING PROGRAM

## 2024-10-18 RX ORDER — VALSARTAN 160 MG/1
160 TABLET ORAL DAILY
Qty: 90 TABLET | Refills: 0 | Status: SHIPPED | OUTPATIENT
Start: 2024-10-18

## 2024-10-24 ENCOUNTER — LAB (OUTPATIENT)
Dept: FAMILY MEDICINE CLINIC | Facility: CLINIC | Age: 69
End: 2024-10-24
Payer: MEDICARE

## 2024-10-24 DIAGNOSIS — D72.829 LEUKOCYTOSIS, UNSPECIFIED TYPE: ICD-10-CM

## 2024-10-24 LAB
DEPRECATED RDW RBC AUTO: 42.1 FL (ref 37–54)
EOSINOPHIL # BLD MANUAL: 0.38 10*3/MM3 (ref 0–0.4)
EOSINOPHIL NFR BLD MANUAL: 3 % (ref 0.3–6.2)
ERYTHROCYTE [DISTWIDTH] IN BLOOD BY AUTOMATED COUNT: 12.5 % (ref 12.3–15.4)
HCT VFR BLD AUTO: 42.1 % (ref 34–46.6)
HGB BLD-MCNC: 13.6 G/DL (ref 12–15.9)
LYMPHOCYTES # BLD MANUAL: 7.31 10*3/MM3 (ref 0.7–3.1)
LYMPHOCYTES NFR BLD MANUAL: 5 % (ref 5–12)
MCH RBC QN AUTO: 30.2 PG (ref 26.6–33)
MCHC RBC AUTO-ENTMCNC: 32.3 G/DL (ref 31.5–35.7)
MCV RBC AUTO: 93.6 FL (ref 79–97)
MONOCYTES # BLD: 0.63 10*3/MM3 (ref 0.1–0.9)
NEUTROPHILS # BLD AUTO: 4.28 10*3/MM3 (ref 1.7–7)
NEUTROPHILS NFR BLD MANUAL: 34 % (ref 42.7–76)
PLAT MORPH BLD: NORMAL
PLATELET # BLD AUTO: 249 10*3/MM3 (ref 140–450)
PMV BLD AUTO: 11.8 FL (ref 6–12)
RBC # BLD AUTO: 4.5 10*6/MM3 (ref 3.77–5.28)
RBC MORPH BLD: NORMAL
VARIANT LYMPHS NFR BLD MANUAL: 3 % (ref 0–5)
VARIANT LYMPHS NFR BLD MANUAL: 55 % (ref 19.6–45.3)
WBC MORPH BLD: NORMAL
WBC NRBC COR # BLD AUTO: 12.6 10*3/MM3 (ref 3.4–10.8)

## 2024-10-24 PROCEDURE — 85025 COMPLETE CBC W/AUTO DIFF WBC: CPT | Performed by: STUDENT IN AN ORGANIZED HEALTH CARE EDUCATION/TRAINING PROGRAM

## 2024-10-24 PROCEDURE — 36415 COLL VENOUS BLD VENIPUNCTURE: CPT

## 2024-10-28 ENCOUNTER — HOSPITAL ENCOUNTER (OUTPATIENT)
Dept: BONE DENSITY | Facility: HOSPITAL | Age: 69
Discharge: HOME | End: 2024-10-28
Admitting: OBSTETRICS & GYNECOLOGY
Payer: MEDICARE

## 2024-10-28 DIAGNOSIS — D72.829 LEUKOCYTOSIS, UNSPECIFIED TYPE: Primary | ICD-10-CM

## 2024-10-28 DIAGNOSIS — Z78.0 POSTMENOPAUSAL STATE: ICD-10-CM

## 2024-10-28 PROCEDURE — 77080 DXA BONE DENSITY AXIAL: CPT

## 2025-01-15 RX ORDER — VALSARTAN 160 MG/1
160 TABLET ORAL DAILY
Qty: 90 TABLET | Refills: 0 | Status: SHIPPED | OUTPATIENT
Start: 2025-01-15

## 2025-01-24 ENCOUNTER — LAB (OUTPATIENT)
Dept: FAMILY MEDICINE CLINIC | Facility: CLINIC | Age: 70
End: 2025-01-24
Payer: MEDICARE

## 2025-01-24 DIAGNOSIS — D72.829 LEUKOCYTOSIS, UNSPECIFIED TYPE: ICD-10-CM

## 2025-01-24 LAB
DEPRECATED RDW RBC AUTO: 47.7 FL (ref 37–54)
EOSINOPHIL # BLD MANUAL: 0.13 10*3/MM3 (ref 0–0.4)
EOSINOPHIL NFR BLD MANUAL: 1 % (ref 0.3–6.2)
ERYTHROCYTE [DISTWIDTH] IN BLOOD BY AUTOMATED COUNT: 13.9 % (ref 12.3–15.4)
HCT VFR BLD AUTO: 40.4 % (ref 34–46.6)
HGB BLD-MCNC: 12.8 G/DL (ref 12–15.9)
LARGE PLATELETS: ABNORMAL
LYMPHOCYTES # BLD MANUAL: 8.59 10*3/MM3 (ref 0.7–3.1)
LYMPHOCYTES NFR BLD MANUAL: 1 % (ref 5–12)
MCH RBC QN AUTO: 29.4 PG (ref 26.6–33)
MCHC RBC AUTO-ENTMCNC: 31.7 G/DL (ref 31.5–35.7)
MCV RBC AUTO: 92.9 FL (ref 79–97)
MONOCYTES # BLD: 0.13 10*3/MM3 (ref 0.1–0.9)
NEUTROPHILS # BLD AUTO: 4.36 10*3/MM3 (ref 1.7–7)
NEUTROPHILS NFR BLD MANUAL: 32 % (ref 42.7–76)
NEUTS BAND NFR BLD MANUAL: 1 % (ref 0–5)
NEUTS VAC BLD QL SMEAR: ABNORMAL
PATHOLOGY REVIEW: YES
PLATELET # BLD AUTO: 267 10*3/MM3 (ref 140–450)
PMV BLD AUTO: 11.9 FL (ref 6–12)
RBC # BLD AUTO: 4.35 10*6/MM3 (ref 3.77–5.28)
SCAN SLIDE: NORMAL
SMUDGE CELLS BLD QL SMEAR: ABNORMAL
STOMATOCYTES BLD QL SMEAR: ABNORMAL
VARIANT LYMPHS NFR BLD MANUAL: 2 % (ref 0–5)
VARIANT LYMPHS NFR BLD MANUAL: 63 % (ref 19.6–45.3)
WBC NRBC COR # BLD AUTO: 13.22 10*3/MM3 (ref 3.4–10.8)

## 2025-01-24 PROCEDURE — 36415 COLL VENOUS BLD VENIPUNCTURE: CPT

## 2025-01-24 PROCEDURE — 85007 BL SMEAR W/DIFF WBC COUNT: CPT | Performed by: STUDENT IN AN ORGANIZED HEALTH CARE EDUCATION/TRAINING PROGRAM

## 2025-01-27 LAB
LAB AP CASE REPORT: NORMAL
PATH REPORT.FINAL DX SPEC: NORMAL

## 2025-01-28 DIAGNOSIS — D72.829 LEUKOCYTOSIS, UNSPECIFIED TYPE: Primary | ICD-10-CM

## 2025-02-28 ENCOUNTER — TELEPHONE (OUTPATIENT)
Dept: ONCOLOGY | Facility: CLINIC | Age: 70
End: 2025-02-28
Payer: MEDICARE

## 2025-02-28 NOTE — TELEPHONE ENCOUNTER
Hub is instructed to read the documentation below to patient     LVM FOR PATIENT THAT WE NEEDED TO MOVE APPT UP TO 9AM ON 03/10 IF THAT WILL NOT WORK PLEASE RETURN MY CALL.

## 2025-02-28 NOTE — TELEPHONE ENCOUNTER
Name: Kami Mcneil    Relationship: Self        HUB PROVIDED THE RELAY MESSAGE FROM THE OFFICE   PATIENT VOICED UNDERSTANDING AND HAS NO FURTHER QUESTIONS AT THIS TIME    ADDITIONAL INFORMATION: CONFIRMED APPT CHANGE.

## 2025-03-09 NOTE — PROGRESS NOTES
HEMATOLOGY ONCOLOGY OUTPATIENT CONSULTATION       Patient name: Kami Mcneil  : 1955  MRN: [id number]  Primary Care Physician: Les Ruvalcaba DO  Referring Physician: Les Ruvalcaba, *  Reason For Consult: Leukocytosis      History of Present Illness:  Patient is a 69 y.o. female with past medical history significant for hypertension, hyperlipidemia, negative for carotid arterial disease (father  of aneurysm), hx of BCC of face presents for chronic leukocytosis.    -2025 WBC 13.22 (differential lymphocytes 55%, ALC 7300), hemoglobin 12.8/hematocrit 40.2 with normal indices, platelets 267K.  -2025 peripheral smear: Leukocytosis with absolute atypical lymphocytosis  -CMP within normal limits, Vitamin B12 244, TSH 0.950,   -Cologuard Negative.    OF NOTE:  Over the last 8 years patient has had 4 times where her white count has been greater than normal with otherwise normal CBC, with lymphocytes ranging from 29% - 58%.  With the last 4 CBCs with differentials the lymphocytes ranging from 46-48% with ALC's being 4280, 4650, 5620, and 7310      Subjective:  -3/10/2025 Patient presents for initial consultation and denies fatigue, unintentional weight loss, fevers, chills, drenching night sweats, lymphadenopathy.  She has a negative colonoscopy in , negative Cologuard in the last 10 years, normal mammograms, normal brain MRI (screening given her father  of a cerebral aneurysm).  Denies any other symptoms.      Past Medical History:   Diagnosis Date    Bitten by dog     Cataract     Heart murmur     Hyperkalemia     Hypertension     Lump or mass in breast        Past Surgical History:   Procedure Laterality Date    DILATATION AND CURETTAGE      EYE SURGERY      OOPHORECTOMY      tumor on ovary removed    SKIN LESION EXCISION         Current Outpatient Medications:     multivitamin with minerals (Centrum Silver 50+Women) tablet tablet, Take 1 tablet by  mouth Daily., Disp: , Rfl:     Omega-3 Fatty Acids (fish oil) 1000 MG capsule capsule, Take 1 capsule by mouth Daily With Breakfast., Disp: , Rfl:     valsartan (DIOVAN) 160 MG tablet, TAKE 1 TABLET BY MOUTH EVERY DAY, Disp: 90 tablet, Rfl: 0    No Known Allergies    Family History   Problem Relation Age of Onset    COPD Mother         Cause of death    Hyperlipidemia Mother     Hyperlipidemia Father     Stroke Father         Cause of death at age 77    Cerebral aneurysm Father         cause of death    Cerebral aneurysm Sister     Pernicious anemia Maternal Grandmother     Hypertension Other     Stroke Other      Cancer-related family history is not on file.    Social History     Tobacco Use    Smoking status: Never    Smokeless tobacco: Never   Substance Use Topics    Alcohol use: Never    Drug use: Never     Social History     Social History Narrative    Not on file     Review of Systems:  Constitutional: Denies any weakness, fatigue, lack of appetite, excessive appetite, weight change, chills or fever.  Eyes: Reports no blurry vision, no double vision, no pain in the eyes, dry eyes or tearing.  ENT: Reports no decreased hearing capacity, ear pain or tinnitus. Denies any epistaxis, nasal congestion, mouth sores or bleeding, tooth or jaw pain, sore throat or hoarseness.  Respiratory: Denies any cough, sputum production or hemoptysis. There is no wheezing, shortness of breath or any other respiratory complaints.  Cardiovascular: Denies any chest pain, shortness of breath when lying down, shortness of breath with activity, palpitations, or leg swelling.  Breasts: Denies any lumps, bumps, pain, nipple changes or discharge in the breasts.  Gastrointestinal: There are no complaints of abdominal pain, difficulty swallowing or painful swallowing, anorexia, nausea, vomiting, diarrhea, constipation, heartburn, blood in the stools, dark stools, or change in bowel habits or hemorrhoids.  Genitourinary: Denies any pain or  "burning on urination, blood in the urine or frequent urination.  Musculoskeletal: Denies painful joints, no swelling of the joints, muscle or back pain. Denies any pain or tingling in the legs, hands or feet.  Neuropsychiatric: Denies any nervousness, depressed mood, headaches, blackouts, dizziness, weakness of limbs, loss of sensation, loss of balance, loss of coordination or difficulty in speaking.  Cutaneous: Denies any dry skin, itching, rash, change in the color of the skin, or any other cutaneous complaints.  Hematologic/Lymphatic: Denies any bruising or bleeding. Report no recent development of palpable or painful lymph nodes.  Allergy/Immunology: Denies rash/hayfever symptoms or any recent history of pneumonia, recurrent sinusitis, urinary infection or any other history of an ongoing infection.  Endocrine: Reports no intolerance to heat or cold, excessive thirst or excessive urination.      Objective:    Vital Signs:  Vitals:    03/10/25 0851   BP: 168/76   Pulse: 65   Temp: 97.6 °F (36.4 °C)   TempSrc: Oral   SpO2: 100%   Weight: 65.7 kg (144 lb 12.8 oz)   Height: 162.6 cm (64.02\")   PainSc: 0-No pain     Body mass index is 24.84 kg/m².      Physical Exam:   ECO  SKIN: No rash or ulcers. Subcutaneous with no palpable nodules.  GENERAL: The patient is a pleasant middle aged white female who appears their stated age and is awake, in no acute distress, alert and oriented x3.  SKIN: No rash or ulcers.   HEME/LYMPHATICS: No bruising or petechiae on visual inspection. No lymphadenopathy on visual inspection and palpation of pre and postauricular, submental, cervical, supraclavicular, infraclavicular bilateral axillary, bilateral groin lymph nodes.  HEAD/FACE: atraumatic and normocephalic. Normal hair.  EYES: PERRLA/EOMI. No scleral icterus. No tearing or dry eyes.  ENT: External ears normal with no evidence of discharge. No evidence of ulceration or bleeding in the nostrils. Mouth has no ulcers, bleeding or " inflammation of the gums, floor or roof of the mouth with normal dentition for age.  NECK: Supple, symmetric.   CHEST/RESPIRATORY: Expansion maintained bilaterally and symmetrically. On auscultation, clear breath sounds in both lungs. No wheezes, rhonchi or rales.  BREAST: Deferred.  CARDIOVASCULAR: On auscultation, regular rate and rhythm. No murmurs, gallops or rubs are heard.  GASTROINTESTINAL/ABDOMEN: Symmetric. On auscultation of the abdomen, there are normal bowel sounds in all four quadrants. Non-tender to palpation no splenomegaly palpated  GENITOURINARY: Deferred.  NEUROLOGIC: Alert and oriented time, person, and place, with no apparent changes in recent or remote memory. Cranial nerves II-XII are grossly intact. Sensation is maintained in the extremities. Strength and tone appear normal for age and equal in the extremities. Gait is normal.  MUSCULOSKELETAL: No cyanosis, clubbing or edema in the extremities. There are no surgical scars on the extremities.  PSYCHIATRIC: The patient maintains judgment and has good insight. The patient has no changes in mood or affection.     Lab Results - Last 18 Months   Lab Units 01/24/25  1125 10/24/24  1025 09/16/24  1127   WBC 10*3/mm3 13.22* 12.60* 14.90*   HEMOGLOBIN g/dL 12.8 13.6 13.5   HEMATOCRIT % 40.4 42.1 42.3   PLATELETS 10*3/mm3 267 249 255   MCV fL 92.9 93.6 93.2     Lab Results - Last 18 Months   Lab Units 09/16/24  1127 09/11/23  0941   SODIUM mmol/L 142 142   POTASSIUM mmol/L 3.8 4.0   CHLORIDE mmol/L 105 105   CO2 mmol/L 28.0 25.0   BUN mg/dL 14 16   CREATININE mg/dL 0.79 0.90   CALCIUM mg/dL 10.1 9.9   BILIRUBIN mg/dL 0.5 0.4   ALK PHOS U/L 82 79   ALT (SGPT) U/L 22 19   AST (SGOT) U/L 27 29   GLUCOSE mg/dL 67 75     Lab Results   Component Value Date    GLUCOSE 67 09/16/2024    BUN 14 09/16/2024    CREATININE 0.79 09/16/2024    EGFRIFNONA 71 06/17/2021    EGFRIFAFRI 75 01/13/2017    BCR 17.7 09/16/2024    K 3.8 09/16/2024    CO2 28.0 09/16/2024     "CALCIUM 10.1 2024    ALBUMIN 4.5 2024    AST 27 2024    ALT 22 2024     Lab Results   Component Value Date    FOLATE >20.00 2020       Lab Results   Component Value Date    TSHRMSIG18 244 2024     No results found for: \"SPEP\", \"UPEP\"  No results found for: \"LDH\", \"URICACID\"  No results found for: \"IGOR\", \"RF\", \"SEDRATE\"  No results found for: \"FIBRINOGEN\", \"HAPTOGLOBIN\"  No results found for: \"PTT\", \"INR\"  No results found for: \"\"  No results found for: \"CEA\"  No components found for: \"CA-19-9\"  No results found for: \"PSA\"  No results found for: \"SEDRATE\"       Assessment & Plan   69 y.o. female with past medical history significant for hypertension, hyperlipidemia, negative for carotid arterial disease (father  of aneurysm), hx of BCC of face presents for chronic leukocytosis.    Given continued leukocytosis, specifically lymphocytosis, further evaluation for potential chronic lymphocytic leukemia is recommended.    I will order flow cytometry/FISH for t(11;14, t(11q;v) to rule out MCL, pending will consider: cytogenetics, IGHV mutational status  -I will also order baseline CBC differential, CMP, LDH, uric acid, baseline beta-2 microglobulin and IgG levels, and hepatitis panel (hepatitis panel in case we choose to do an anti-CD20 therapy in the future), should patient be a ibrutinib candidate EKG baseline would need to be done.  We discussed CLL patients do have a higher predisposition to infection as her immune status is compromised.  -Should the patient have low IgG levels will consider scheduling for IVIG (0.4 mg/kg every 4 to 6 weeks).  IVIG has been shown to decrease risk of infections, but not overall survival.  It is usually used in patients with recurrent major infections at least 2 in the last 6 months.  Explained to patient this is a chronic leukemia and if remaining asymptomatic we may just need to active surveillance, as there is no data to show increased " overall survival by initiating treatment sooner.  The newer agents have great outcomes, particularly with poor prognosis disease, with studies underway that may change treatment strategies.    -pt also had other patient information and scans in her chart-contacted medical records-     RTC in 3 weeks to discuss results    Thank you very much for providing the opportunity to participate in this patient’s care. Please do not hesitate to call if there are any other questions.

## 2025-03-10 ENCOUNTER — LAB (OUTPATIENT)
Dept: LAB | Facility: HOSPITAL | Age: 70
End: 2025-03-10
Payer: MEDICARE

## 2025-03-10 ENCOUNTER — CONSULT (OUTPATIENT)
Dept: ONCOLOGY | Facility: CLINIC | Age: 70
End: 2025-03-10
Payer: MEDICARE

## 2025-03-10 VITALS
HEART RATE: 65 BPM | WEIGHT: 144.8 LBS | SYSTOLIC BLOOD PRESSURE: 168 MMHG | HEIGHT: 64 IN | OXYGEN SATURATION: 100 % | DIASTOLIC BLOOD PRESSURE: 76 MMHG | BODY MASS INDEX: 24.72 KG/M2 | TEMPERATURE: 97.6 F

## 2025-03-10 DIAGNOSIS — R74.02 ELEVATION OF LEVELS OF LACTIC ACID DEHYDROGENASE (LDH): ICD-10-CM

## 2025-03-10 DIAGNOSIS — D72.820 LYMPHOCYTOSIS: Primary | ICD-10-CM

## 2025-03-10 DIAGNOSIS — D72.820 LYMPHOCYTOSIS: ICD-10-CM

## 2025-03-10 PROBLEM — D22.62 MELANOCYTIC NEVI OF LEFT UPPER LIMB, INCLUDING SHOULDER: Status: ACTIVE | Noted: 2019-08-12

## 2025-03-10 PROBLEM — D18.01 HEMANGIOMA OF SKIN AND SUBCUTANEOUS TISSUE: Status: ACTIVE | Noted: 2021-06-30

## 2025-03-10 PROBLEM — Z85.828 PERSONAL HISTORY OF OTHER MALIGNANT NEOPLASM OF SKIN: Status: ACTIVE | Noted: 2019-11-25

## 2025-03-10 PROBLEM — D22.72 MELANOCYTIC NEVI OF LEFT LOWER LIMB, INCLUDING HIP: Status: ACTIVE | Noted: 2019-08-12

## 2025-03-10 PROBLEM — D22.5 MELANOCYTIC NEVI OF TRUNK: Status: ACTIVE | Noted: 2019-08-12

## 2025-03-10 PROBLEM — L57.0 ACTINIC KERATOSIS: Status: ACTIVE | Noted: 2024-03-10

## 2025-03-10 PROBLEM — C44.319 BASAL CELL CARCINOMA OF SKIN OF OTHER PARTS OF FACE: Status: ACTIVE | Noted: 2019-08-19

## 2025-03-10 PROBLEM — L90.5 SCAR CONDITIONS AND FIBROSIS OF SKIN: Status: ACTIVE | Noted: 2024-03-05

## 2025-03-10 PROBLEM — L57.8 OTHER SKIN CHANGES DUE TO CHRONIC EXPOSURE TO NONIONIZING RADIATION: Status: ACTIVE | Noted: 2019-08-12

## 2025-03-10 PROBLEM — D22.71 MELANOCYTIC NEVI OF RIGHT LOWER LIMB, INCLUDING HIP: Status: ACTIVE | Noted: 2019-08-12

## 2025-03-10 PROBLEM — R03.0 FINDING OF ABOVE NORMAL BLOOD PRESSURE: Status: ACTIVE | Noted: 2022-07-30

## 2025-03-10 PROBLEM — D23.62 BENIGN NEOPLASM OF SKIN OF LEFT UPPER EXTREMITY: Status: ACTIVE | Noted: 2025-03-06

## 2025-03-10 PROBLEM — L81.4 OTHER MELANIN HYPERPIGMENTATION: Status: ACTIVE | Noted: 2019-08-12

## 2025-03-10 PROBLEM — D48.5 NEOPLASM OF UNCERTAIN BEHAVIOR OF SKIN: Status: ACTIVE | Noted: 2019-08-12

## 2025-03-10 PROBLEM — L81.9 DISORDER OF PIGMENTATION: Status: ACTIVE | Noted: 2022-11-16

## 2025-03-10 LAB
ALBUMIN SERPL-MCNC: 4.7 G/DL (ref 3.5–5.2)
ALBUMIN/GLOB SERPL: 1.9 G/DL
ALP SERPL-CCNC: 83 U/L (ref 39–117)
ALT SERPL W P-5'-P-CCNC: 25 U/L (ref 1–33)
ANION GAP SERPL CALCULATED.3IONS-SCNC: 12.8 MMOL/L (ref 5–15)
AST SERPL-CCNC: 40 U/L (ref 1–32)
B2 MICROGLOB SERPL-MCNC: 2.4 MG/L (ref 0.8–2.2)
BASOPHILS # BLD AUTO: 0.03 10*3/MM3 (ref 0–0.2)
BASOPHILS NFR BLD AUTO: 0.2 % (ref 0–1.5)
BILIRUB SERPL-MCNC: 0.3 MG/DL (ref 0–1.2)
BUN SERPL-MCNC: 16 MG/DL (ref 8–23)
BUN/CREAT SERPL: 16.8 (ref 7–25)
CALCIUM SPEC-SCNC: 10.2 MG/DL (ref 8.6–10.5)
CHLORIDE SERPL-SCNC: 101 MMOL/L (ref 98–107)
CO2 SERPL-SCNC: 26.2 MMOL/L (ref 22–29)
CREAT SERPL-MCNC: 0.95 MG/DL (ref 0.57–1)
DEPRECATED RDW RBC AUTO: 46.8 FL (ref 37–54)
EGFRCR SERPLBLD CKD-EPI 2021: 65.8 ML/MIN/1.73
EOSINOPHIL # BLD AUTO: 0.17 10*3/MM3 (ref 0–0.4)
EOSINOPHIL NFR BLD AUTO: 1.2 % (ref 0.3–6.2)
ERYTHROCYTE [DISTWIDTH] IN BLOOD BY AUTOMATED COUNT: 14.3 % (ref 12.3–15.4)
GLOBULIN UR ELPH-MCNC: 2.5 GM/DL
GLUCOSE SERPL-MCNC: 84 MG/DL (ref 65–99)
HAV IGM SERPL QL IA: NORMAL
HBV CORE IGM SERPL QL IA: NORMAL
HBV SURFACE AG SERPL QL IA: NORMAL
HCT VFR BLD AUTO: 43.5 % (ref 34–46.6)
HCV AB SER QL: NORMAL
HGB BLD-MCNC: 14.2 G/DL (ref 12–15.9)
IGG1 SER-MCNC: 807 MG/DL (ref 700–1600)
LDH SERPL-CCNC: 186 U/L (ref 135–214)
LYMPHOCYTES # BLD AUTO: 8.06 10*3/MM3 (ref 0.7–3.1)
LYMPHOCYTES NFR BLD AUTO: 55.2 % (ref 19.6–45.3)
MCH RBC QN AUTO: 29.9 PG (ref 26.6–33)
MCHC RBC AUTO-ENTMCNC: 32.6 G/DL (ref 31.5–35.7)
MCV RBC AUTO: 91.6 FL (ref 79–97)
MONOCYTES # BLD AUTO: 0.56 10*3/MM3 (ref 0.1–0.9)
MONOCYTES NFR BLD AUTO: 3.8 % (ref 5–12)
NEUTROPHILS NFR BLD AUTO: 39.6 % (ref 42.7–76)
NEUTROPHILS NFR BLD AUTO: 5.77 10*3/MM3 (ref 1.7–7)
PLATELET # BLD AUTO: 283 10*3/MM3 (ref 140–450)
PMV BLD AUTO: 11.1 FL (ref 6–12)
POTASSIUM SERPL-SCNC: 4 MMOL/L (ref 3.5–5.2)
PROT SERPL-MCNC: 7.2 G/DL (ref 6–8.5)
RBC # BLD AUTO: 4.75 10*6/MM3 (ref 3.77–5.28)
SODIUM SERPL-SCNC: 140 MMOL/L (ref 136–145)
URATE SERPL-MCNC: 4.7 MG/DL (ref 2.4–5.7)
WBC NRBC COR # BLD AUTO: 14.59 10*3/MM3 (ref 3.4–10.8)

## 2025-03-10 PROCEDURE — 80053 COMPREHEN METABOLIC PANEL: CPT | Performed by: INTERNAL MEDICINE

## 2025-03-10 PROCEDURE — 99203 OFFICE O/P NEW LOW 30 MIN: CPT | Performed by: INTERNAL MEDICINE

## 2025-03-10 PROCEDURE — 85025 COMPLETE CBC W/AUTO DIFF WBC: CPT | Performed by: INTERNAL MEDICINE

## 2025-03-10 PROCEDURE — 3078F DIAST BP <80 MM HG: CPT | Performed by: INTERNAL MEDICINE

## 2025-03-10 PROCEDURE — 3077F SYST BP >= 140 MM HG: CPT | Performed by: INTERNAL MEDICINE

## 2025-03-10 PROCEDURE — 82232 ASSAY OF BETA-2 PROTEIN: CPT | Performed by: INTERNAL MEDICINE

## 2025-03-10 PROCEDURE — 80074 ACUTE HEPATITIS PANEL: CPT | Performed by: INTERNAL MEDICINE

## 2025-03-10 PROCEDURE — 82784 ASSAY IGA/IGD/IGG/IGM EACH: CPT | Performed by: INTERNAL MEDICINE

## 2025-03-10 PROCEDURE — 84550 ASSAY OF BLOOD/URIC ACID: CPT | Performed by: INTERNAL MEDICINE

## 2025-03-10 PROCEDURE — 1126F AMNT PAIN NOTED NONE PRSNT: CPT | Performed by: INTERNAL MEDICINE

## 2025-03-10 PROCEDURE — 36415 COLL VENOUS BLD VENIPUNCTURE: CPT | Performed by: INTERNAL MEDICINE

## 2025-03-10 PROCEDURE — 83615 LACTATE (LD) (LDH) ENZYME: CPT | Performed by: INTERNAL MEDICINE

## 2025-03-10 RX ORDER — CHLORAL HYDRATE 500 MG
1000 CAPSULE ORAL
COMMUNITY

## 2025-03-11 ENCOUNTER — PATIENT ROUNDING (BHMG ONLY) (OUTPATIENT)
Dept: ONCOLOGY | Facility: CLINIC | Age: 70
End: 2025-03-11
Payer: MEDICARE

## 2025-03-11 NOTE — PROGRESS NOTES
March 11, 2025    Hello, may I speak with Kami Mcneil?    My name is Elena Lees      I am  with [facility]  [address] [place] [address] [phone].    Before we get started may I verify your date of birth? 1955    I am calling to officially welcome you to our practice and ask about your recent visit. Is this a good time to talk? no    Tell me about your visit with us. What things went well?  A My Chart message was sent to the patient.         We're always looking for ways to make our patients' experiences even better. Do you have recommendations on ways we may improve?  no    Overall were you satisfied with your first visit to our practice? yes       I appreciate you taking the time to speak with me today. Is there anything else I can do for you? no      Thank you, and have a great day.

## 2025-03-13 LAB
ABNORMAL WBC NFR SPEC FC: NORMAL %
ANNOTATION COMMENT IMP: NORMAL
ASSESSMENT OF LEUKOCYTES: NORMAL
CLINICAL INFO: NORMAL
GATING STRATEGY: NORMAL
IMMUNOPHENOTYPING STUDY: NORMAL
LABORATORY COMMENT REPORT: NORMAL
PATH INTERP SPEC-IMP: NORMAL
PATHOLOGIST NAME: NORMAL
SPECIMEN SOURCE: NORMAL
VIABLE CELLS NFR SPEC: NORMAL %

## 2025-03-17 LAB
CELLS ANALYZED: NORMAL
CELLS COUNTED: NORMAL
CLINICAL CYTOGENETICIST SPEC: NORMAL
GENETIC DISEASES BLD/T FISH: NORMAL
NARRATIVE DIAGNOSTIC REPORT-IMP: NORMAL
SPECIMEN SOURCE: NORMAL

## 2025-03-26 LAB
DIAGNOSTIC IMP SPEC-IMP: NORMAL
SPECIMEN STATUS: NORMAL

## 2025-04-02 PROBLEM — C91.10 CLL (CHRONIC LYMPHOCYTIC LEUKEMIA): Status: ACTIVE | Noted: 2025-04-02

## 2025-04-02 NOTE — PROGRESS NOTES
HEMATOLOGY ONCOLOGY OUTPATIENT FOLLOW UP       Patient name: Kami Mcneil  : 1955  MRN: [id number]  Primary Care Physician: Les Ruvalcaba DO  Referring Physician: Les Ruvalcaba, *  Reason For Consult: Leukocytosis->chronic lymphocytic leukemia        History of Present Illness:  Patient is a 69 y.o. female with past medical history significant for hypertension, hyperlipidemia, negative for carotid arterial disease (father  of aneurysm), hx of BCC of face presents for chronic leukocytosis.    -2025 WBC 13.22 (differential lymphocytes 55%, ALC 7300), hemoglobin 12.8/hematocrit 40.2 with normal indices, platelets 267K.  -2025 peripheral smear: Leukocytosis with absolute atypical lymphocytosis  -CMP within normal limits, Vitamin B12 244, TSH 0.950,   -Cologuard Negative.    OF NOTE:  Over the last 8 years patient has had 4 times where her white count has been greater than normal with otherwise normal CBC, with lymphocytes ranging from 29% - 58%.  With the last 4 CBCs with differentials the lymphocytes ranging from 46-48% with ALC's being 4280, 4650, 5620, and 7310    -3/10/2025 Patient presents for initial consultation and denies fatigue, unintentional weight loss, fevers, chills, drenching night sweats, lymphadenopathy.  She has a negative colonoscopy in , negative Cologuard in the last 10 years, normal mammograms, normal brain MRI (screening given her father  of a cerebral aneurysm).  Denies any other symptoms.      Subjective:  -3/10/2025 CBC with WBC 14.59 (differential shows ALC 8.06,) hemoglobin 14.2/hematocrit 43.5 with otherwise normal indices platelets 283K, CMP within normal limits except for AST slightly elevated at 40  -uric acid normal at 4.7, IgG normal at 807, acute hepatitis panel-nonreactive, LDH normal at 186,   -Leukemia/lymphoma immunophenotyping profileCD5+, CD23+, FMC7-, CD38-, CD19+, CD20+ and CD22+ B cell lymphoproliferative disorder detected,  phenotype consistent with chronic lymphocytic leukemia (CLL)/small lymphocytic lymphoma (SLL).   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   -Results for CCND1, SHAWNA, chromosome 12, and TP53 were normal.   -T p53 mutation analysis presequencing negative    -4/3/2025 Patient presents for review of consultation labs with her family; still denies fatigue, unintentional weight loss, fevers, chills, drenching night sweats, lymphadenopathy. Denies any other symptoms. Has been reviewing CLL and diets to eat on web, and seeing a functionalist that started her on some natural products.    Past Medical History:   Diagnosis Date    Bitten by dog     Cataract     Heart murmur     Hyperkalemia     Hypertension     Lump or mass in breast        Past Surgical History:   Procedure Laterality Date    DILATATION AND CURETTAGE      EYE SURGERY      OOPHORECTOMY      tumor on ovary removed    SKIN LESION EXCISION           Current Outpatient Medications:     Green Tea, Jania sinensis, (GREEN TEA EXTRACT PO), Take  by mouth., Disp: , Rfl:     Misc Natural Products (Resveratrol Diet) capsule, Take  by mouth. 2 CAPSULES TWICE A DAY, Disp: , Rfl:     Omega-3 Fatty Acids (fish oil) 1000 MG capsule capsule, Take 1 capsule by mouth Daily With Breakfast., Disp: , Rfl:     Turmeric (CURCUMIN 95 PO), Take  by mouth. 3 capsules 3 times a day, Disp: , Rfl:     valsartan (DIOVAN) 160 MG tablet, TAKE 1 TABLET BY MOUTH EVERY DAY, Disp: 90 tablet, Rfl: 0    multivitamin with minerals (Centrum Silver 50+Women) tablet tablet, Take 1 tablet by mouth Daily., Disp: , Rfl:     No Known Allergies    Family History   Problem Relation Age of Onset    COPD Mother         Cause of death    Hyperlipidemia Mother     Hyperlipidemia Father     Stroke Father         Cause of death at age 77    Cerebral aneurysm Father         cause of death    Cerebral aneurysm Sister     Pernicious  anemia Maternal Grandmother     Hypertension Other     Stroke Other        Cancer-related family history is not on file.    Social History     Tobacco Use    Smoking status: Never    Smokeless tobacco: Never   Substance Use Topics    Alcohol use: Never    Drug use: Never     Social History     Social History Narrative    Not on file      Review of Systems:  Constitutional: Denies any weakness, fatigue, lack of appetite, excessive appetite, weight change, chills or fever.  Eyes: Reports no blurry vision, no double vision, no pain in the eyes, dry eyes or tearing.  ENT: Reports no decreased hearing capacity, ear pain or tinnitus. Denies any epistaxis, nasal congestion, mouth sores or bleeding, tooth or jaw pain, sore throat or hoarseness.  Respiratory: Denies any cough, sputum production or hemoptysis. There is no wheezing, shortness of breath or any other respiratory complaints.  Cardiovascular: Denies any chest pain, shortness of breath when lying down, shortness of breath with activity, palpitations, or leg swelling.  Breasts: Denies any lumps, bumps, pain, nipple changes or discharge in the breasts.  Gastrointestinal: There are no complaints of abdominal pain, difficulty swallowing or painful swallowing, anorexia, nausea, vomiting, diarrhea, constipation, heartburn, blood in the stools, dark stools, or change in bowel habits or hemorrhoids.  Genitourinary: Denies any pain or burning on urination, blood in the urine or frequent urination.  Musculoskeletal: Denies painful joints, no swelling of the joints, muscle or back pain. Denies any pain or tingling in the legs, hands or feet.  Neuropsychiatric: Denies any nervousness, depressed mood, headaches, blackouts, dizziness, weakness of limbs, loss of sensation, loss of balance, loss of coordination or difficulty in speaking.  Cutaneous: Denies any dry skin, itching, rash, change in the color of the skin, or any other cutaneous complaints.  Hematologic/Lymphatic:  "Denies any bruising or bleeding. Report no recent development of palpable or painful lymph nodes.  Allergy/Immunology: Denies rash/hayfever symptoms or any recent history of pneumonia, recurrent sinusitis, urinary infection or any other history of an ongoing infection.  Endocrine: Reports no intolerance to heat or cold, excessive thirst or excessive urination.      Objective:  Vital signs:  Vitals:    25 1509   BP: (!) 183/83   Pulse: 62   Temp: 98.3 °F (36.8 °C)   TempSrc: Oral   SpO2: 100%   Weight: 65.7 kg (144 lb 12.8 oz)   Height: 162.6 cm (64.02\")   PainSc: 0-No pain     Body mass index is 24.84 kg/m².      Physical Exam: (counseling visit)  ECO  SKIN: No rash or ulcers. Subcutaneous with no palpable nodules.  GENERAL: The patient is a pleasant middle aged white female who appears their stated age and is awake, in no acute distress, alert and oriented x3.  HEAD/FACE: atraumatic and normocephalic. Normal hair.  EYES: PERRLA/EOMI. No scleral icterus. No tearing or dry eyes.  ENT: External ears normal with no evidence of discharge. No evidence of ulceration or bleeding in the nostrils. Mouth has no ulcers, bleeding or inflammation of the gums, floor or roof of the mouth with normal dentition for age.  NECK: Supple, symmetric.   CHEST/RESPIRATORY: Expansion maintained bilaterally and symmetrically. On auscultation, clear breath sounds in both lungs. No wheezes, rhonchi or rales.  BREAST: Deferred.  NEUROLOGIC: Alert and oriented time, person, and place, with no apparent changes in recent or remote memory. Cranial nerves II-XII are grossly intact. Sensation is maintained in the extremities. Strength and tone appear normal for age and equal in the extremities. Gait is normal.  MUSCULOSKELETAL: No cyanosis, clubbing or edema in the extremities. There are no surgical scars on the extremities.  PSYCHIATRIC: The patient maintains judgment and has good insight. The patient has no changes in mood or affection. " "    Lab Results - Last 18 Months   Lab Units 03/10/25  1000 25  1125 10/24/24  1025   WBC 10*3/mm3 14.59* 13.22* 12.60*   HEMOGLOBIN g/dL 14.2 12.8 13.6   HEMATOCRIT % 43.5 40.4 42.1   PLATELETS 10*3/mm3 283 267 249   MCV fL 91.6 92.9 93.6     Lab Results - Last 18 Months   Lab Units 03/10/25  1000 24  1127   SODIUM mmol/L 140 142   POTASSIUM mmol/L 4.0 3.8   CHLORIDE mmol/L 101 105   CO2 mmol/L 26.2 28.0   BUN mg/dL 16 14   CREATININE mg/dL 0.95 0.79   CALCIUM mg/dL 10.2 10.1   BILIRUBIN mg/dL 0.3 0.5   ALK PHOS U/L 83 82   ALT (SGPT) U/L 25 22   AST (SGOT) U/L 40* 27   GLUCOSE mg/dL 84 67       Lab Results   Component Value Date    GLUCOSE 84 03/10/2025    BUN 16 03/10/2025    CREATININE 0.95 03/10/2025    EGFRIFNONA 71 2021    EGFRIFAFRI 75 2017    BCR 16.8 03/10/2025    K 4.0 03/10/2025    CO2 26.2 03/10/2025    CALCIUM 10.2 03/10/2025    ALBUMIN 4.7 03/10/2025    AST 40 (H) 03/10/2025    ALT 25 03/10/2025       No results for input(s): \"APTT\", \"INR\", \"PTT\" in the last 89293 hours.    No results found for: \"IRON\", \"TIBC\", \"FERRITIN\"    Lab Results   Component Value Date    FOLATE >20.00 2020       No results found for: \"OCCULTBLD\"    No results found for: \"RETICCTPCT\"  Lab Results   Component Value Date    [id number] 244 2024     No results found for: \"SPEP\", \"UPEP\"  LDH   Date Value Ref Range Status   03/10/2025 186 135 - 214 U/L Final     Uric Acid   Date Value Ref Range Status   03/10/2025 4.7 2.4 - 5.7 mg/dL Final     No results found for: \"IGOR\", \"RF\", \"SEDRATE\"  No results found for: \"FIBRINOGEN\", \"HAPTOGLOBIN\"  No results found for: \"PTT\", \"INR\"  No results found for: \"\"  No results found for: \"CEA\"  No components found for: \"CA-19-9\"  No results found for: \"PSA\"  No results found for: \"SEDRATE\"    Assessment & Plan     69 y.o. female with past medical history significant for hypertension, hyperlipidemia, negative for carotid arterial disease (father  of " aneurysm), hx of BCC of face presents for chronic leukocytosis 3/2025 and found to have chronic lymphocytic leukemia..    1. chronic lymphocytic leukemia dx 3/2025, low risk  -Modified PENNY clinical staging system at the time of diagnosis: Low: 0-lymphocytosis in blood or bone marrow, Median survival (w/o treatment): PENNY 0: 150 months,   -BINET staging system: Stage A: 2 or less lymphoid bearing areas enlarged  Median survival: Comparable to age-matched controls.  -cytogenetics/molecular testing for prognosis purposes showed: low risk of disease progression is (13 q. 14 deletion).  -Pt has no known poor prognostic factors (no advanced stage, male sex, atypical morphology, >30% CD38 expression, trisomy 12, deletion of 11q23, deletion/mutation of p53, unmutated IGVH genes).    -3/10/2025 CBC with WBC 14.59 (differential shows ALC 8.06,) hemoglobin 14.2/hematocrit 43.5 with otherwise normal indices platelets 283K, CMP within normal limits except for AST slightly elevated at 40  -3/10/2025 baseline uric acid normal at 4.7, IgG normal at 807, acute hepatitis panel-nonreactive, LDH normal at 186,   -3/10/2025 Leukemia/lymphoma immunophenotyping profile flow cytometry showed: CD5+, CD23+, FMC7-, CD38-, CD19+, CD20+ and CD22+ B cell lymphoproliferative disorder detected, phenotype consistent with chronic lymphocytic leukemia (CLL)/small lymphocytic lymphoma (SLL).   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 Results for CCND1, SHAWNA, chromosome 12, and TP53 were normal. presence of three IGH signals in the absence of a CCND1/IGH fusion suggests a rearrangement with another candidate gene, such as the BCL2 gene. IGH/BCL2 FISH analysis may be considered.    -Tp53 mutation analysis presequencing negative    -4/3/2025 Explained to patient this is a chronic leukemia and if remaining asymptomatic we may just need to active surveillance, as there is no  data to show increased overall survival by initiating treatment sooner.  The newer agents have great outcomes, particularly with poor prognosis disease, with studies underway that may change treatment strategies.  -We discussed CLL patients do have a higher predisposition to infection as her immune status is compromised.- IgG level normal on diagnosis.    [place] 0-2/low risk: For this reason she will continue on active surveillance for significant disease related symptoms including severe fatigue, drenching night sweats, unintentional weight loss (>= to 10% in previous 6 months), fever without infection, threatened end organ function, progressive bulky disease (spleen greater than 6 cm below costal margin, lymph nodes greater than 10 cm), progressive anemia or thrombocytopenia, or steroid refractory autoimmune cytopenias. If criteria are met, then we will initiate treatment (such as acalabrutinib +/- Obinutuzumab, ibrutinib, or venetoclax + Obinutuzumab, all category 1 in NCCN guidelines for CLL/SLL without del (17p)/TP53 mutation.    It is not uncommon to have reactive lymphocytosis after the start of treatment, which may last for several months and be exacerbated by steroids.        Thank you very much for providing the opportunity to participate in this patient’s care. Please do not hesitate to call if there are any other questions.

## 2025-04-03 ENCOUNTER — OFFICE VISIT (OUTPATIENT)
Dept: ONCOLOGY | Facility: CLINIC | Age: 70
End: 2025-04-03
Payer: MEDICARE

## 2025-04-03 VITALS
HEIGHT: 64 IN | TEMPERATURE: 98.3 F | OXYGEN SATURATION: 100 % | HEART RATE: 62 BPM | DIASTOLIC BLOOD PRESSURE: 83 MMHG | WEIGHT: 144.8 LBS | BODY MASS INDEX: 24.72 KG/M2 | SYSTOLIC BLOOD PRESSURE: 183 MMHG

## 2025-04-03 DIAGNOSIS — C91.10 CLL (CHRONIC LYMPHOCYTIC LEUKEMIA): Primary | ICD-10-CM

## 2025-04-03 RX ORDER — RESVERA/CHROM/GR.TEA/EGCG/DIG3 50MG-20MCG
CAPSULE ORAL
COMMUNITY

## 2025-04-03 NOTE — PATIENT INSTRUCTIONS
RTC in 3 months with labs a few days prior to MD, please call if any worsening fatigue, unintentional weight loss, fevers, chills, drenching night sweats, lymphadenopathy.

## 2025-04-11 RX ORDER — VALSARTAN 160 MG/1
160 TABLET ORAL DAILY
Qty: 90 TABLET | Refills: 0 | Status: SHIPPED | OUTPATIENT
Start: 2025-04-11

## 2025-06-21 NOTE — PROGRESS NOTES
HEMATOLOGY ONCOLOGY OUTPATIENT FOLLOW UP       Patient name: Kami Mcneil  : 1955  MRN: [id number]  Primary Care Physician: Les Ruvalcaba DO  Referring Physician: Les Ruvalcaba, *  Reason For Consult: Leukocytosis->chronic lymphocytic leukemia    History of Present Illness:  Patient is a 69 y.o. female with past medical history significant for hypertension, hyperlipidemia, negative for carotid arterial disease (father  of aneurysm), hx of BCC of face presents for chronic leukocytosis.    -2025 WBC 13.22 (differential lymphocytes 55%, ALC 7300), hemoglobin 12.8/hematocrit 40.2 with normal indices, platelets 267K.  -2025 peripheral smear: Leukocytosis with absolute atypical lymphocytosis  -CMP within normal limits, Vitamin B12 244, TSH 0.950,   -Cologuard Negative.    OF NOTE:  Over the last 8 years patient has had 4 times where her white count has been greater than normal with otherwise normal CBC, with lymphocytes ranging from 29% - 58%.  With the last 4 CBCs with differentials the lymphocytes ranging from 46-48% with ALC's being 4280, 4650, 5620, and 7310    -3/10/2025 Patient presents for initial consultation and denies fatigue, unintentional weight loss, fevers, chills, drenching night sweats, lymphadenopathy.  She has a negative colonoscopy in , negative Cologuard in the last 10 years, normal mammograms, normal brain MRI (screening given her father  of a cerebral aneurysm).  Denies any other symptoms.    -3/10/2025 CBC with WBC 14.59 (differential shows ALC 8.06,) hemoglobin 14.2/hematocrit 43.5 with otherwise normal indices platelets 283K, CMP within normal limits except for AST slightly elevated at 40  -uric acid normal at 4.7, IgG normal at 807, acute hepatitis panel-nonreactive, LDH normal at 186,   -Leukemia/lymphoma immunophenotyping profileCD5+, CD23+, FMC7-, CD38-, CD19+, CD20+ and CD22+ B cell lymphoproliferative disorder detected, phenotype consistent  with chronic lymphocytic leukemia (CLL)/small lymphocytic lymphoma (SLL).   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   -Results for CCND1, SHAWNA, chromosome 12, and TP53 were normal.   -T p53 mutation analysis presequencing negative    -4/3/2025 Patient presents for review of consultation labs with her family; still denies fatigue, unintentional weight loss, fevers, chills, drenching night sweats, lymphadenopathy. Denies any other symptoms. Has been reviewing CLL and diets to eat on web, and seeing a functionalist that started her on some natural products.      Subjective:  -7/11/2025 WBC 14.07 (neutrophils 20%, lymphocytes 75.6% (ALC 10,640), monocytes 3.6%) hemoglobin 30.8/hematocrit 42.4 with normal indices platelets 337K, CMP within normal limits except for creatinine 1.06 (with EGFR 57.7/previously 0.95) AST slightly elevated at 39 however specimen hemolyzed (result could be falsely elevated from such),  (normal, also hemolyzed)    -7/16/2025 Patient presents for follow up; still denies fatigue disease except for when she exerts in the summer heat, is running and swimming often using SPF and wearing proper sun wear, also denies unintentional weight loss, fevers, chills, drenching night sweats, lymphadenopathy. Denies any other symptoms. Has been reviewing her labs and is worried about the increase in her lymphocytes.    Past Medical History:   Diagnosis Date    Bitten by dog     Cataract     Hyperkalemia     Hypertension     Lump or mass in breast     folded tissue in mammo, did not need bx       Past Surgical History:   Procedure Laterality Date    CERVICAL CONIZATION, LEEP N/A 1989    was a cryo, not a technical leep, normal pap smears since    DILATATION AND CURETTAGE      EYE SURGERY      OOPHORECTOMY      tumor on ovary removed    SKIN LESION EXCISION           Current Outpatient Medications:     Green Tea, Jania  sinensis, (GREEN TEA EXTRACT PO), Take  by mouth., Disp: , Rfl:     Misc Natural Products (Resveratrol Diet) capsule, Take  by mouth. 2 CAPSULES TWICE A DAY, Disp: , Rfl:     Omega-3 Fatty Acids (fish oil) 1000 MG capsule capsule, Take 1 capsule by mouth Daily With Breakfast., Disp: , Rfl:     Turmeric (CURCUMIN 95 PO), Take  by mouth. 3 capsules 3 times a day, Disp: , Rfl:     valsartan (DIOVAN) 160 MG tablet, TAKE 1 TABLET BY MOUTH EVERY DAY, Disp: 90 tablet, Rfl: 0    No Known Allergies    Family History   Problem Relation Age of Onset    COPD Mother         Cause of death    Hyperlipidemia Mother     Hyperlipidemia Father     Stroke Father         Cause of death at age 77    Cerebral aneurysm Father         cause of death    Cerebral aneurysm Sister     Pernicious anemia Maternal Grandmother     Hypertension Other     Stroke Other        Cancer-related family history is not on file.    Social History     Tobacco Use    Smoking status: Never    Smokeless tobacco: Never   Substance Use Topics    Alcohol use: Never    Drug use: Never     Social History     Social History Narrative    Not on file      Constitutional: Denies any weakness, fatigue, lack of appetite, excessive appetite, weight change, chills or fever.  Eyes: Reports no blurry vision, no double vision, no pain in the eyes, dry eyes or tearing.  ENT: Reports no decreased hearing capacity, ear pain or tinnitus. Denies any epistaxis, nasal congestion, mouth sores or bleeding, tooth or jaw pain, sore throat or hoarseness.  Respiratory: Denies any cough, sputum production or hemoptysis. There is no wheezing, shortness of breath or any other respiratory complaints.  Cardiovascular: Denies any chest pain, shortness of breath when lying down, shortness of breath with activity, palpitations, or leg swelling.  Breasts: Denies any lumps, bumps, pain, nipple changes or discharge in the breasts.  Gastrointestinal: There are no complaints of abdominal pain,  "difficulty swallowing or painful swallowing, anorexia, nausea, vomiting, diarrhea, constipation, heartburn, blood in the stools, dark stools, or change in bowel habits or hemorrhoids.  Genitourinary: Denies any pain or burning on urination, blood in the urine or frequent urination.  Musculoskeletal: Denies painful joints, no swelling of the joints, muscle or back pain. Denies any pain or tingling in the legs, hands or feet.  Neuropsychiatric: Denies any nervousness, depressed mood, headaches, blackouts, dizziness, weakness of limbs, loss of sensation, loss of balance, loss of coordination or difficulty in speaking.  Cutaneous: Denies any dry skin, itching, rash, change in the color of the skin, or any other cutaneous complaints.  Hematologic/Lymphatic: Denies any bruising or bleeding. Report no recent development of palpable or painful lymph nodes.  Allergy/Immunology: Denies rash/hayfever symptoms or any recent history of pneumonia, recurrent sinusitis, urinary infection or any other history of an ongoing infection.  Endocrine: Reports no intolerance to heat or cold, excessive thirst or excessive urination.    Objective:  Vital signs:  Vitals:    25 0910   BP: 143/84   Pulse: 68   Temp: 97.7 °F (36.5 °C)   TempSrc: Oral   SpO2: 100%   Weight: 63.6 kg (140 lb 3.2 oz)   Height: 162.6 cm (64.02\")   PainSc: 0-No pain     Body mass index is 24.05 kg/m².      Physical Exam:   ECO  SKIN: No rash or ulcers. Subcutaneous with no palpable nodules.  GENERAL: The patient is a pleasant middle aged white female who appears their stated age and is awake, in no acute distress, alert and oriented x3.  SKIN: No rash or ulcers.   HEME/LYMPHATICS: No bruising or petechiae on visual inspection. No lymphadenopathy on visual inspection and palpation of pre and postauricular, submental, cervical, supraclavicular, infraclavicular bilateral axillary.  On last exam had normal bilateral groin lymph nodes.  HEAD/FACE: atraumatic and " normocephalic. Normal hair.  EYES: PERRLA/EOMI. No scleral icterus. No tearing or dry eyes.  ENT: External ears normal with no evidence of discharge. No evidence of ulceration or bleeding in the nostrils. Mouth has no ulcers, bleeding or inflammation of the gums, floor or roof of the mouth with normal dentition for age.  NECK: Supple, symmetric.   CHEST/RESPIRATORY: Expansion maintained bilaterally and symmetrically. On auscultation, clear breath sounds in both lungs. No wheezes, rhonchi or rales.  BREAST: Deferred.  CARDIOVASCULAR: On auscultation, regular rate and rhythm. No murmurs, gallops or rubs are heard.  GASTROINTESTINAL/ABDOMEN: Symmetric. On auscultation of the abdomen, there are normal bowel sounds in all four quadrants. Non-tender to palpation no splenomegaly palpated  GENITOURINARY: Deferred.  NEUROLOGIC: Alert and oriented time, person, and place, with no apparent changes in recent or remote memory. Cranial nerves II-XII are grossly intact. Sensation is maintained in the extremities. Strength and tone appear normal for age and equal in the extremities. Gait is normal.  MUSCULOSKELETAL: No cyanosis, clubbing or edema in the extremities. There are no surgical scars on the extremities.  PSYCHIATRIC: The patient maintains judgment and has good insight. The patient has no changes in mood or affection. .    Lab Results - Last 18 Months   Lab Units 07/11/25  0808 03/10/25  1000 01/24/25  1125   WBC 10*3/mm3 14.07* 14.59* 13.22*   HEMOGLOBIN g/dL 13.8 14.2 12.8   HEMATOCRIT % 42.4 43.5 40.4   PLATELETS 10*3/mm3 337 283 267   MCV fL 93.8 91.6 92.9     Lab Results - Last 18 Months   Lab Units 07/11/25  0808 03/10/25  1000 09/16/24  1127   SODIUM mmol/L 136 140 142   POTASSIUM mmol/L 4.5 4.0 3.8   CHLORIDE mmol/L 102 101 105   CO2 mmol/L 22.7 26.2 28.0   BUN mg/dL 21.3 16 14   CREATININE mg/dL 1.06* 0.95 0.79   CALCIUM mg/dL 9.7 10.2 10.1   BILIRUBIN mg/dL 0.4 0.3 0.5   ALK PHOS U/L 89 83 82   ALT (SGPT) U/L 25  "25 22   AST (SGOT) U/L 39* 40* 27   GLUCOSE mg/dL 90 84 67       Lab Results   Component Value Date    GLUCOSE 90 2025    BUN 21.3 2025    CREATININE 1.06 (H) 2025    EGFRIFNONA 71 2021    EGFRIFAFRI 75 2017    BCR 20.1 2025    K 4.5 2025    CO2 22.7 2025    CALCIUM 9.7 2025    ALBUMIN 4.7 2025    AST 39 (H) 2025    ALT 25 2025       No results for input(s): \"APTT\", \"INR\", \"PTT\" in the last 06445 hours.    No results found for: \"IRON\", \"TIBC\", \"FERRITIN\"    Lab Results   Component Value Date    FOLATE >20.00 2020       No results found for: \"OCCULTBLD\"    No results found for: \"RETICCTPCT\"  Lab Results   Component Value Date    RVEMCZPT00 244 2024     No results found for: \"SPEP\", \"UPEP\"  LDH   Date Value Ref Range Status   2025 194 135 - 214 U/L Final     Comment:     Specimen hemolyzed.  Results may be affected.     Uric Acid   Date Value Ref Range Status   03/10/2025 4.7 2.4 - 5.7 mg/dL Final     No results found for: \"IGOR\", \"RF\", \"SEDRATE\"  No results found for: \"FIBRINOGEN\", \"HAPTOGLOBIN\"  No results found for: \"PTT\", \"INR\"  No results found for: \"\"  No results found for: \"CEA\"  No components found for: \"CA-19-9\"  No results found for: \"PSA\"  No results found for: \"SEDRATE\"    Assessment & Plan   69 y.o. female with past medical history significant for hypertension, hyperlipidemia, negative for carotid arterial disease (father  of aneurysm), hx of BCC of face presents for chronic leukocytosis 3/2025 and found to have chronic lymphocytic leukemia..    1. chronic lymphocytic leukemia dx 3/2025, low risk  -Modified PENNY clinical staging system at the time of diagnosis: Low: 0-lymphocytosis in blood or bone marrow, Median survival (w/o treatment): PENNY 0: 150 months,   -BINET staging system: Stage A: 2 or less lymphoid bearing areas enlarged  Median survival: Comparable to age-matched controls.  -cytogenetics/molecular " testing for prognosis purposes showed: low risk of disease progression is (13 q. 14 deletion).  -Pt has no known poor prognostic factors (no advanced stage, male sex, atypical morphology, >30% CD38 expression, trisomy 12, deletion of 11q23, deletion/mutation of p53, unmutated IGVH genes).    -3/10/2025 CBC with WBC 14.59 (differential shows ALC 8.06,) hemoglobin 14.2/hematocrit 43.5 with otherwise normal indices platelets 283K, CMP within normal limits except for AST slightly elevated at 40  -3/10/2025 baseline uric acid normal at 4.7, IgG normal at 807, acute hepatitis panel-nonreactive, LDH normal at 186,   -3/10/2025 Leukemia/lymphoma immunophenotyping profile flow cytometry showed: CD5+, CD23+, FMC7-, CD38-, CD19+, CD20+ and CD22+ B cell lymphoproliferative disorder detected, phenotype consistent with chronic lymphocytic leukemia (CLL)/small lymphocytic lymphoma (SLL).   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 Results for CCND1, SHAWNA, chromosome 12, and TP53 were normal. presence of three IGH signals in the absence of a CCND1/IGH fusion suggests a rearrangement with another candidate gene, such as the BCL2 gene. IGH/BCL2 FISH analysis may be considered.    -Tp53 mutation analysis presequencing negative    -4/3/2025 Explained to patient this is a chronic leukemia and if remaining asymptomatic we may just need to active surveillance, as there is no data to show increased overall survival by initiating treatment sooner.  The newer agents have great outcomes, particularly with poor prognosis disease, with studies underway that may change treatment strategies.  -We discussed CLL patients do have a higher predisposition to infection as her immune status is compromised.- IgG level normal on diagnosis.    --7/11/2025 WBC 14.07 (neutrophils 20%, lymphocytes 75.6% (ALC 10,640), monocytes 3.6%) hemoglobin 30.8/hematocrit 42.4 with normal  indices platelets 337K, CMP within normal limits except for creatinine 1.06 (with EGFR 57.7/previously 0.95) AST slightly elevated at 39 however specimen hemolyzed (result could be falsely elevated from such),  (normal, also hemolyzed)  -7/2025 counseled the patient again that lymphocyte number of percentage can Waffle up and down during the course send as long as she is not having symptoms and her labs are otherwise stable with no new anemia thrombocytopenia etc. that we will just continue to monitor.  Patient reassured.    [place] 0-2/low risk: For this reason she will continue on active surveillance for significant disease related symptoms including severe fatigue, drenching night sweats, unintentional weight loss (>= to 10% in previous 6 months), fever without infection, threatened end organ function, progressive bulky disease (spleen greater than 6 cm below costal margin, lymph nodes greater than 10 cm), progressive anemia or thrombocytopenia, or steroid refractory autoimmune cytopenias. If criteria are met, then we will initiate treatment (such as acalabrutinib +/- Obinutuzumab, ibrutinib, or venetoclax + Obinutuzumab, all category 1 in NCCN guidelines for CLL/SLL without del (17p)/TP53 mutation.      Patient return to clinic 3 months with CBC, CMP and LDH a few days prior        It is not uncommon to have reactive lymphocytosis after the start of treatment, which may last for several months and be exacerbated by steroids.          Thank you very much for providing the opportunity to participate in this patient’s care. Please do not hesitate to call if there are any other questions.

## 2025-06-23 ENCOUNTER — TRANSCRIBE ORDERS (OUTPATIENT)
Dept: ADMINISTRATIVE | Facility: HOSPITAL | Age: 70
End: 2025-06-23
Payer: MEDICARE

## 2025-06-23 DIAGNOSIS — Z12.31 SCREENING MAMMOGRAM, ENCOUNTER FOR: Primary | ICD-10-CM

## 2025-06-23 DIAGNOSIS — Z12.31 ENCOUNTER FOR SCREENING MAMMOGRAM FOR MALIGNANT NEOPLASM OF BREAST: Primary | ICD-10-CM

## 2025-07-07 ENCOUNTER — TELEPHONE (OUTPATIENT)
Dept: ONCOLOGY | Facility: CLINIC | Age: 70
End: 2025-07-07

## 2025-07-07 DIAGNOSIS — Z00.00 ENCOUNTER FOR GENERAL ADULT MEDICAL EXAMINATION WITHOUT ABNORMAL FINDINGS: Primary | ICD-10-CM

## 2025-07-07 DIAGNOSIS — E55.9 VITAMIN D DEFICIENCY DISEASE: ICD-10-CM

## 2025-07-07 DIAGNOSIS — D18.01 HEMANGIOMA OF SKIN AND SUBCUTANEOUS TISSUE: ICD-10-CM

## 2025-07-08 RX ORDER — VALSARTAN 160 MG/1
160 TABLET ORAL DAILY
Qty: 90 TABLET | Refills: 0 | Status: SHIPPED | OUTPATIENT
Start: 2025-07-08

## 2025-07-09 ENCOUNTER — TELEPHONE (OUTPATIENT)
Dept: ONCOLOGY | Facility: CLINIC | Age: 70
End: 2025-07-09
Payer: MEDICARE

## 2025-07-11 ENCOUNTER — TELEPHONE (OUTPATIENT)
Dept: FAMILY MEDICINE CLINIC | Facility: CLINIC | Age: 70
End: 2025-07-11
Payer: MEDICARE

## 2025-07-11 ENCOUNTER — LAB (OUTPATIENT)
Dept: LAB | Facility: HOSPITAL | Age: 70
End: 2025-07-11
Payer: MEDICARE

## 2025-07-11 DIAGNOSIS — I10 PRIMARY HYPERTENSION: ICD-10-CM

## 2025-07-11 DIAGNOSIS — Z00.00 MEDICARE ANNUAL WELLNESS VISIT, SUBSEQUENT: Primary | ICD-10-CM

## 2025-07-11 DIAGNOSIS — E78.2 MIXED HYPERLIPIDEMIA: ICD-10-CM

## 2025-07-11 DIAGNOSIS — E53.8 VITAMIN B12 DEFICIENCY: ICD-10-CM

## 2025-07-11 DIAGNOSIS — E55.9 VITAMIN D DEFICIENCY DISEASE: ICD-10-CM

## 2025-07-11 DIAGNOSIS — C91.10 CLL (CHRONIC LYMPHOCYTIC LEUKEMIA): ICD-10-CM

## 2025-07-11 LAB
25(OH)D3 SERPL-MCNC: 59.2 NG/ML (ref 30–100)
ALBUMIN SERPL-MCNC: 4.7 G/DL (ref 3.5–5.2)
ALBUMIN/GLOB SERPL: 2 G/DL
ALP SERPL-CCNC: 89 U/L (ref 39–117)
ALT SERPL W P-5'-P-CCNC: 25 U/L (ref 1–33)
ANION GAP SERPL CALCULATED.3IONS-SCNC: 11.3 MMOL/L (ref 5–15)
AST SERPL-CCNC: 39 U/L (ref 1–32)
BASOPHILS # BLD AUTO: 0.03 10*3/MM3 (ref 0–0.2)
BASOPHILS NFR BLD AUTO: 0.2 % (ref 0–1.5)
BILIRUB SERPL-MCNC: 0.4 MG/DL (ref 0–1.2)
BUN SERPL-MCNC: 21.3 MG/DL (ref 8–23)
BUN/CREAT SERPL: 20.1 (ref 7–25)
CALCIUM SPEC-SCNC: 9.7 MG/DL (ref 8.6–10.5)
CHLORIDE SERPL-SCNC: 102 MMOL/L (ref 98–107)
CO2 SERPL-SCNC: 22.7 MMOL/L (ref 22–29)
CREAT SERPL-MCNC: 1.06 MG/DL (ref 0.57–1)
DEPRECATED RDW RBC AUTO: 46.1 FL (ref 37–54)
EGFRCR SERPLBLD CKD-EPI 2021: 57.7 ML/MIN/1.73
EOSINOPHIL # BLD AUTO: 0.09 10*3/MM3 (ref 0–0.4)
EOSINOPHIL NFR BLD AUTO: 0.6 % (ref 0.3–6.2)
ERYTHROCYTE [DISTWIDTH] IN BLOOD BY AUTOMATED COUNT: 14 % (ref 12.3–15.4)
GLOBULIN UR ELPH-MCNC: 2.4 GM/DL
GLUCOSE SERPL-MCNC: 90 MG/DL (ref 65–99)
HCT VFR BLD AUTO: 42.4 % (ref 34–46.6)
HGB BLD-MCNC: 13.8 G/DL (ref 12–15.9)
IMM GRANULOCYTES # BLD AUTO: ABNORMAL 10*3/UL
IMM GRANULOCYTES NFR BLD AUTO: ABNORMAL %
LDH SERPL-CCNC: 194 U/L (ref 135–214)
LYMPHOCYTES # BLD AUTO: 10.64 10*3/MM3 (ref 0.7–3.1)
LYMPHOCYTES NFR BLD AUTO: 75.6 % (ref 19.6–45.3)
MCH RBC QN AUTO: 30.5 PG (ref 26.6–33)
MCHC RBC AUTO-ENTMCNC: 32.5 G/DL (ref 31.5–35.7)
MCV RBC AUTO: 93.8 FL (ref 79–97)
MONOCYTES # BLD AUTO: 0.5 10*3/MM3 (ref 0.1–0.9)
MONOCYTES NFR BLD AUTO: 3.6 % (ref 5–12)
NEUTROPHILS NFR BLD AUTO: 2.81 10*3/MM3 (ref 1.7–7)
NEUTROPHILS NFR BLD AUTO: 20 % (ref 42.7–76)
PLATELET # BLD AUTO: 337 10*3/MM3 (ref 140–450)
PMV BLD AUTO: 10.7 FL (ref 6–12)
POTASSIUM SERPL-SCNC: 4.5 MMOL/L (ref 3.5–5.2)
PROT SERPL-MCNC: 7.1 G/DL (ref 6–8.5)
RBC # BLD AUTO: 4.52 10*6/MM3 (ref 3.77–5.28)
SODIUM SERPL-SCNC: 136 MMOL/L (ref 136–145)
WBC NRBC COR # BLD AUTO: 14.07 10*3/MM3 (ref 3.4–10.8)

## 2025-07-11 PROCEDURE — 85025 COMPLETE CBC W/AUTO DIFF WBC: CPT

## 2025-07-11 PROCEDURE — 36415 COLL VENOUS BLD VENIPUNCTURE: CPT

## 2025-07-11 PROCEDURE — 82306 VITAMIN D 25 HYDROXY: CPT | Performed by: INTERNAL MEDICINE

## 2025-07-11 PROCEDURE — 80053 COMPREHEN METABOLIC PANEL: CPT | Performed by: INTERNAL MEDICINE

## 2025-07-11 PROCEDURE — 83615 LACTATE (LD) (LDH) ENZYME: CPT | Performed by: INTERNAL MEDICINE

## 2025-07-11 NOTE — TELEPHONE ENCOUNTER
Caller: Kami Mcneil    Relationship: Self    Best call back number: [phone]     What orders are you requesting (i.e. lab or imaging): LAB    In what timeframe would the patient need to come in: WEEK PRIOR TO NOV 18 TH APPOINTMENT    Where will you receive your lab/imaging services: LAB    Additional notes: PATIENT WOULD LIKE TO HAVE AN APPOINTMENT A WEEK PRIOR TO THE 11/18 APPOINTMENT FOR LABS. PLEASE CALL TO SCHEDULE

## 2025-07-15 LAB — ZINC SERPL-MCNC: 77 UG/DL (ref 44–115)

## 2025-07-16 ENCOUNTER — OFFICE VISIT (OUTPATIENT)
Dept: ONCOLOGY | Facility: CLINIC | Age: 70
End: 2025-07-16
Payer: MEDICARE

## 2025-07-16 VITALS
HEIGHT: 64 IN | TEMPERATURE: 97.7 F | DIASTOLIC BLOOD PRESSURE: 84 MMHG | SYSTOLIC BLOOD PRESSURE: 143 MMHG | WEIGHT: 140.2 LBS | HEART RATE: 68 BPM | OXYGEN SATURATION: 100 % | BODY MASS INDEX: 23.93 KG/M2

## 2025-07-16 DIAGNOSIS — C91.10 CLL (CHRONIC LYMPHOCYTIC LEUKEMIA): Primary | ICD-10-CM

## 2025-07-16 NOTE — PATIENT INSTRUCTIONS
RTC in 3-4 months with labs a few days prior to MD, please call if any worsening fatigue, unintentional weight loss, fevers, chills, drenching night sweats, lymphadenopathy, anemia symptoms or noticeable bleeding/bruising in interim

## 2025-08-21 ENCOUNTER — TRANSCRIBE ORDERS (OUTPATIENT)
Dept: ADMINISTRATIVE | Facility: HOSPITAL | Age: 70
End: 2025-08-21
Payer: MEDICARE

## 2025-08-21 DIAGNOSIS — Z13.6 ENCOUNTER FOR SCREENING FOR VASCULAR DISEASE: Primary | ICD-10-CM
